# Patient Record
Sex: MALE | Race: BLACK OR AFRICAN AMERICAN
[De-identification: names, ages, dates, MRNs, and addresses within clinical notes are randomized per-mention and may not be internally consistent; named-entity substitution may affect disease eponyms.]

---

## 2018-04-25 ENCOUNTER — HOSPITAL ENCOUNTER (EMERGENCY)
Dept: HOSPITAL 54 - ER | Age: 38
Discharge: HOME | End: 2018-04-25
Payer: MEDICARE

## 2018-04-25 VITALS — WEIGHT: 190 LBS | BODY MASS INDEX: 27.2 KG/M2 | HEIGHT: 70 IN

## 2018-04-25 VITALS — SYSTOLIC BLOOD PRESSURE: 174 MMHG | DIASTOLIC BLOOD PRESSURE: 104 MMHG

## 2018-04-25 DIAGNOSIS — Z89.512: ICD-10-CM

## 2018-04-25 DIAGNOSIS — R19.7: Primary | ICD-10-CM

## 2018-04-25 DIAGNOSIS — I12.0: ICD-10-CM

## 2018-04-25 DIAGNOSIS — R10.9: ICD-10-CM

## 2018-04-25 DIAGNOSIS — N18.6: ICD-10-CM

## 2018-04-25 DIAGNOSIS — Z79.4: ICD-10-CM

## 2018-04-25 DIAGNOSIS — Z60.2: ICD-10-CM

## 2018-04-25 DIAGNOSIS — E11.42: ICD-10-CM

## 2018-04-25 DIAGNOSIS — E11.22: ICD-10-CM

## 2018-04-25 DIAGNOSIS — K21.9: ICD-10-CM

## 2018-04-25 LAB
ALBUMIN SERPL BCP-MCNC: 2.8 G/DL (ref 3.4–5)
ALP SERPL-CCNC: 144 U/L (ref 46–116)
ALT SERPL W P-5'-P-CCNC: 18 U/L (ref 12–78)
AST SERPL W P-5'-P-CCNC: 17 U/L (ref 15–37)
BASOPHILS # BLD AUTO: 0.1 /CMM (ref 0–0.2)
BASOPHILS NFR BLD AUTO: 0.4 % (ref 0–2)
BILIRUB DIRECT SERPL-MCNC: 0.1 MG/DL (ref 0–0.2)
BILIRUB SERPL-MCNC: 0.6 MG/DL (ref 0.2–1)
BUN SERPL-MCNC: 32 MG/DL (ref 7–18)
CALCIUM SERPL-MCNC: 8 MG/DL (ref 8.5–10.1)
CHLORIDE SERPL-SCNC: 98 MMOL/L (ref 98–107)
CO2 SERPL-SCNC: 29 MMOL/L (ref 21–32)
CREAT SERPL-MCNC: 13.1 MG/DL (ref 0.6–1.3)
EOSINOPHIL # BLD AUTO: 0.2 /CMM (ref 0–0.7)
EOSINOPHIL NFR BLD AUTO: 0.8 % (ref 0–6)
GLUCOSE SERPL-MCNC: 123 MG/DL (ref 74–106)
HCT VFR BLD AUTO: 31 % (ref 39–51)
HGB BLD-MCNC: 10.7 G/DL (ref 13.5–17.5)
LIPASE SERPL-CCNC: 38 U/L (ref 73–393)
LYMPHOCYTES NFR BLD AUTO: 1.1 /CMM (ref 0.8–4.8)
LYMPHOCYTES NFR BLD AUTO: 5.9 % (ref 20–44)
MCH RBC QN AUTO: 29 PG (ref 26–33)
MCHC RBC AUTO-ENTMCNC: 34 G/DL (ref 31–36)
MCV RBC AUTO: 83 FL (ref 80–96)
MONOCYTES NFR BLD AUTO: 0.9 /CMM (ref 0.1–1.3)
MONOCYTES NFR BLD AUTO: 4.7 % (ref 2–12)
NEUTROPHILS # BLD AUTO: 17.2 /CMM (ref 1.8–8.9)
NEUTROPHILS NFR BLD AUTO: 88.2 % (ref 43–81)
PLATELET # BLD AUTO: 470 /CMM (ref 150–450)
POTASSIUM SERPL-SCNC: 3.6 MMOL/L (ref 3.5–5.1)
PROT SERPL-MCNC: 8 G/DL (ref 6.4–8.2)
RBC # BLD AUTO: 3.74 MIL/UL (ref 4.5–6)
RDW COEFFICIENT OF VARIATION: 18.5 (ref 11.5–15)
SODIUM SERPL-SCNC: 137 MMOL/L (ref 136–145)
WBC NRBC COR # BLD AUTO: 19.5 K/UL (ref 4.3–11)

## 2018-04-25 PROCEDURE — A4606 OXYGEN PROBE USED W OXIMETER: HCPCS

## 2018-04-25 PROCEDURE — Z7610: HCPCS

## 2018-04-25 SDOH — SOCIAL STABILITY - SOCIAL INSECURITY: PROBLEMS RELATED TO LIVING ALONE: Z60.2

## 2018-04-25 NOTE — NUR
CALLED [facility] FOR TRANSPORT BACK TO [facility], [place], TRIP 
#[id number]

-------------------------------------------------------------------------------

Addendum: 04/25/18 at 1938 by TAZ

-------------------------------------------------------------------------------

***[place] 2049

## 2018-04-25 NOTE — NUR
Patient discharged to home in stable condition. Written and verbal after care 
instructions given. Patient verbalizes understanding of instruction.

-------------------------------------------------------------------------------

Addendum: 04/25/18 at 1953 by ANDRES

-------------------------------------------------------------------------------

WRONG DOCUMENTATION.

## 2018-04-25 NOTE — NUR
BBPA FROM [facility]: NAUSEA, VOMITING, C.DIFF. NOTED L BKA. SEEN BY MD FOR 
EVAL. VSS. SAFETY AND COMFORT MEASURES PROVIDED. WILL MONITOR.

## 2018-06-03 ENCOUNTER — HOSPITAL ENCOUNTER (INPATIENT)
Dept: HOSPITAL 54 - ER | Age: 38
LOS: 4 days | Discharge: NURSING, REHAB OR LONG-TERM CARE | DRG: 314 | End: 2018-06-07
Attending: NURSE PRACTITIONER | Admitting: NURSE PRACTITIONER
Payer: MEDICARE

## 2018-06-03 VITALS — HEIGHT: 72 IN | WEIGHT: 226.25 LBS | BODY MASS INDEX: 30.65 KG/M2

## 2018-06-03 DIAGNOSIS — I12.0: ICD-10-CM

## 2018-06-03 DIAGNOSIS — Z99.2: ICD-10-CM

## 2018-06-03 DIAGNOSIS — Z89.512: ICD-10-CM

## 2018-06-03 DIAGNOSIS — D64.9: ICD-10-CM

## 2018-06-03 DIAGNOSIS — E83.9: ICD-10-CM

## 2018-06-03 DIAGNOSIS — Z89.429: ICD-10-CM

## 2018-06-03 DIAGNOSIS — Z86.711: ICD-10-CM

## 2018-06-03 DIAGNOSIS — A04.72: ICD-10-CM

## 2018-06-03 DIAGNOSIS — E10.22: ICD-10-CM

## 2018-06-03 DIAGNOSIS — I25.2: ICD-10-CM

## 2018-06-03 DIAGNOSIS — N18.6: ICD-10-CM

## 2018-06-03 DIAGNOSIS — Z79.4: ICD-10-CM

## 2018-06-03 DIAGNOSIS — E87.6: ICD-10-CM

## 2018-06-03 DIAGNOSIS — I42.9: ICD-10-CM

## 2018-06-03 DIAGNOSIS — J90: ICD-10-CM

## 2018-06-03 DIAGNOSIS — I31.3: Primary | ICD-10-CM

## 2018-06-03 DIAGNOSIS — E10.51: ICD-10-CM

## 2018-06-03 DIAGNOSIS — E10.65: ICD-10-CM

## 2018-06-03 DIAGNOSIS — Z89.411: ICD-10-CM

## 2018-06-03 DIAGNOSIS — K21.9: ICD-10-CM

## 2018-06-03 LAB
APTT PPP: 31 SEC (ref 23–34)
BASOPHILS # BLD AUTO: 0 /CMM (ref 0–0.2)
BASOPHILS NFR BLD AUTO: 0.2 % (ref 0–2)
EOSINOPHIL NFR BLD AUTO: 0.9 % (ref 0–6)
HCT VFR BLD AUTO: 36 % (ref 39–51)
HGB BLD-MCNC: 11.4 G/DL (ref 13.5–17.5)
INR PPP: 0.99 (ref 0.87–1.13)
LYMPHOCYTES NFR BLD AUTO: 1.9 /CMM (ref 0.8–4.8)
LYMPHOCYTES NFR BLD AUTO: 12.7 % (ref 20–44)
MCHC RBC AUTO-ENTMCNC: 32 G/DL (ref 31–36)
MCV RBC AUTO: 88 FL (ref 80–96)
MONOCYTES NFR BLD AUTO: 0.7 /CMM (ref 0.1–1.3)
MONOCYTES NFR BLD AUTO: 4.4 % (ref 2–12)
NEUTROPHILS # BLD AUTO: 12.3 /CMM (ref 1.8–8.9)
NEUTROPHILS NFR BLD AUTO: 81.8 % (ref 43–81)
PLATELET # BLD AUTO: 482 /CMM (ref 150–450)
RBC # BLD AUTO: 4.03 MIL/UL (ref 4.5–6)
RDW COEFFICIENT OF VARIATION: 21.8 (ref 11.5–15)
TROPONIN I SERPL-MCNC: < 0.017 NG/ML (ref 0–0.06)
WBC NRBC COR # BLD AUTO: 15.1 K/UL (ref 4.3–11)

## 2018-06-03 PROCEDURE — A4606 OXYGEN PROBE USED W OXIMETER: HCPCS

## 2018-06-03 PROCEDURE — Z7610: HCPCS

## 2018-06-03 NOTE — NUR
BBRA 39 FROM [facility]; CHEST PRESSURE ALL DAY. PT AOX3 RR EVEN AND 
UNLABORED. NO SOB NOTED. NAD NOTED. NO NVD AT THIS TIME. PT NOT DIAPHORETIC. PT 
GOWNED AND PLACED ON MONITOR. DR GONZALEZ AT BEDSIDE. PT WITH HD SITE RIGHT CW. 
LEFT BKA X 8 YRS AGO AND RIGHT BIG TOE AMPUTATION MARCH 2018.

## 2018-06-04 VITALS — SYSTOLIC BLOOD PRESSURE: 160 MMHG | DIASTOLIC BLOOD PRESSURE: 83 MMHG

## 2018-06-04 VITALS — SYSTOLIC BLOOD PRESSURE: 147 MMHG | DIASTOLIC BLOOD PRESSURE: 79 MMHG

## 2018-06-04 VITALS — SYSTOLIC BLOOD PRESSURE: 157 MMHG | DIASTOLIC BLOOD PRESSURE: 83 MMHG

## 2018-06-04 VITALS — SYSTOLIC BLOOD PRESSURE: 188 MMHG | DIASTOLIC BLOOD PRESSURE: 97 MMHG

## 2018-06-04 VITALS — SYSTOLIC BLOOD PRESSURE: 130 MMHG | DIASTOLIC BLOOD PRESSURE: 78 MMHG

## 2018-06-04 VITALS — DIASTOLIC BLOOD PRESSURE: 71 MMHG | SYSTOLIC BLOOD PRESSURE: 142 MMHG

## 2018-06-04 VITALS — DIASTOLIC BLOOD PRESSURE: 84 MMHG | SYSTOLIC BLOOD PRESSURE: 130 MMHG

## 2018-06-04 VITALS — DIASTOLIC BLOOD PRESSURE: 72 MMHG | SYSTOLIC BLOOD PRESSURE: 139 MMHG

## 2018-06-04 VITALS — DIASTOLIC BLOOD PRESSURE: 70 MMHG | SYSTOLIC BLOOD PRESSURE: 137 MMHG

## 2018-06-04 VITALS — DIASTOLIC BLOOD PRESSURE: 71 MMHG | SYSTOLIC BLOOD PRESSURE: 140 MMHG

## 2018-06-04 VITALS — DIASTOLIC BLOOD PRESSURE: 75 MMHG | SYSTOLIC BLOOD PRESSURE: 145 MMHG

## 2018-06-04 VITALS — DIASTOLIC BLOOD PRESSURE: 85 MMHG | SYSTOLIC BLOOD PRESSURE: 154 MMHG

## 2018-06-04 VITALS — SYSTOLIC BLOOD PRESSURE: 140 MMHG | DIASTOLIC BLOOD PRESSURE: 68 MMHG

## 2018-06-04 VITALS — SYSTOLIC BLOOD PRESSURE: 144 MMHG | DIASTOLIC BLOOD PRESSURE: 71 MMHG

## 2018-06-04 VITALS — SYSTOLIC BLOOD PRESSURE: 140 MMHG | DIASTOLIC BLOOD PRESSURE: 71 MMHG

## 2018-06-04 VITALS — DIASTOLIC BLOOD PRESSURE: 97 MMHG | SYSTOLIC BLOOD PRESSURE: 188 MMHG

## 2018-06-04 LAB
BUN SERPL-MCNC: 22 MG/DL (ref 7–18)
CALCIUM SERPL-MCNC: 9.2 MG/DL (ref 8.5–10.1)
CHLORIDE SERPL-SCNC: 99 MMOL/L (ref 98–107)
CHOLEST SERPL-MCNC: 127 MG/DL (ref ?–200)
CO2 SERPL-SCNC: 33 MMOL/L (ref 21–32)
CREAT SERPL-MCNC: 8.6 MG/DL (ref 0.6–1.3)
FERRITIN SERPL-MCNC: 136 NG/ML (ref 8–388)
GLUCOSE SERPL-MCNC: 133 MG/DL (ref 74–106)
HDLC SERPL-MCNC: 38 MG/DL (ref 40–60)
IRON SERPL-MCNC: 30 UG/DL (ref 50–175)
LDLC SERPL DIRECT ASSAY-MCNC: 74 MG/DL (ref 0–99)
MAGNESIUM SERPL-MCNC: 2.2 MG/DL (ref 1.8–2.4)
NT-PROBNP SERPL-MCNC: (no result) PG/ML (ref 0–125)
PHOSPHATE SERPL-MCNC: 3.8 MG/DL (ref 2.5–4.9)
POTASSIUM SERPL-SCNC: 3.3 MMOL/L (ref 3.5–5.1)
SODIUM SERPL-SCNC: 140 MMOL/L (ref 136–145)
TIBC SERPL-MCNC: 138 UG/DL (ref 250–450)
TRIGL SERPL-MCNC: 68 MG/DL (ref 30–150)
TSH SERPL DL<=0.005 MIU/L-ACNC: 5.08 UIU/ML (ref 0.36–3.74)

## 2018-06-04 PROCEDURE — 5A1D70Z PERFORMANCE OF URINARY FILTRATION, INTERMITTENT, LESS THAN 6 HOURS PER DAY: ICD-10-PCS | Performed by: INTERNAL MEDICINE

## 2018-06-04 RX ADMIN — Medication SCH EACH: at 21:55

## 2018-06-04 RX ADMIN — PANTOPRAZOLE SODIUM SCH MG: 40 TABLET, DELAYED RELEASE ORAL at 09:14

## 2018-06-04 RX ADMIN — HEPARIN SODIUM SCH UNITS: 5000 INJECTION INTRAVENOUS; SUBCUTANEOUS at 21:54

## 2018-06-04 RX ADMIN — AMLODIPINE BESYLATE SCH MG: 10 TABLET ORAL at 09:13

## 2018-06-04 RX ADMIN — Medication SCH EACH: at 17:32

## 2018-06-04 RX ADMIN — FERROUS SULFATE TAB 325 MG (65 MG ELEMENTAL FE) SCH MG: 325 (65 FE) TAB at 09:14

## 2018-06-04 RX ADMIN — ZOLPIDEM TARTRATE PRN MG: 5 TABLET, FILM COATED ORAL at 02:18

## 2018-06-04 RX ADMIN — HEPARIN SODIUM SCH UNITS: 5000 INJECTION INTRAVENOUS; SUBCUTANEOUS at 09:15

## 2018-06-04 RX ADMIN — ASPIRIN SCH MG: 325 TABLET, FILM COATED ORAL at 09:12

## 2018-06-04 RX ADMIN — FERROUS SULFATE TAB 325 MG (65 MG ELEMENTAL FE) SCH MG: 325 (65 FE) TAB at 17:19

## 2018-06-04 RX ADMIN — ISOSORBIDE DINITRATE SCH MG: 20 TABLET ORAL at 09:13

## 2018-06-04 RX ADMIN — PREGABALIN SCH MG: 25 CAPSULE ORAL at 09:13

## 2018-06-04 RX ADMIN — VANCOMYCIN HYDROCHLORIDE SCH MG: 125 CAPSULE ORAL at 17:18

## 2018-06-04 RX ADMIN — INSULIN GLARGINE SCH UNIT: 100 INJECTION, SOLUTION SUBCUTANEOUS at 22:00

## 2018-06-04 RX ADMIN — CARVEDILOL SCH MG: 12.5 TABLET, FILM COATED ORAL at 17:20

## 2018-06-04 RX ADMIN — CARVEDILOL SCH MG: 12.5 TABLET, FILM COATED ORAL at 09:14

## 2018-06-04 RX ADMIN — Medication SCH EACH: at 12:22

## 2018-06-04 RX ADMIN — ISOSORBIDE DINITRATE SCH MG: 20 TABLET ORAL at 17:19

## 2018-06-04 RX ADMIN — ATORVASTATIN CALCIUM SCH MG: 10 TABLET, FILM COATED ORAL at 21:55

## 2018-06-04 RX ADMIN — VANCOMYCIN HYDROCHLORIDE SCH MG: 125 CAPSULE ORAL at 12:21

## 2018-06-04 RX ADMIN — Medication SCH EACH: at 06:39

## 2018-06-04 NOTE — NUR
MS RN OPENING NOTES: 



PATIENT IN BED, AOX3, ON O2 AT 2 LPM VIA NC, BREATHING EVEN AND UNLABORED. BREATH SOUNDS 
CLEAR TO AUSCULTATION.  PATIENT APPEARS CALM AND IN NO DISTRESS. DENIES PAIN/ CHEST PAIN AT 
THIS TIME.  PATIENT HAS HD CATHETER OVER  LFA G 18 AND RAC G 18, INTACT AND PATENT TO FLUSH. 
 PROVIDED FOR COMFORT AND SAFETY. BED IN LOWEST AND LOCKED POSITION, SIDERAILS UP X 3, CALL 
LIGHT WITHIN REACH. WILL CONT TO MONITOR.

## 2018-06-04 NOTE — NUR
PT BACK FROM CT ANGIO HEART W/ 3 D IMAGE PROCEDURE WITH STABLE V/S.DENIES ANY PAIN OR 
DISTRESS.METOPROLOL 10 MG (5 MG IVPX2) AND NTG GIVEN IN CT.

## 2018-06-04 NOTE — NUR
TELE RN AM NOTES



RECEIVED PT A & O X 3, ABLE TO MAKE NEEDS KNOWN. PATIENT ON 2L O2 VIA NC, TOLERATING WELL. 
NO SOB, NO S/S OF PAIN OR ACUTE DISTRESS NOTED.ON TELE MONITORING, SR 76.HOB ELEVATED. HAS 
HD CATHETER IN PLACE TO RCW. IV ACCESS TO  LFA 18G, INTACT PATENT, SL. PATIENT PLACED ON 
CONTACT ISOLATION PREC. FOR C DIFF.COMFORTABLE IN BED. SAFETY MEASURES APPLIED, BED IN LOW 
LOCKED POSITION. SIDE RAILS UP X2, CALL LIGHT WITHIN REACH. WILL CONTINUE TO MONITOR CLOSELY 
FOR CHANGES.

## 2018-06-04 NOTE — NUR
RN NOTES: 



PATIENT'S BLOOD SUGAR CHECKED  MG/DL. PATIENT REFUSED INSULIN SAYING "MY PANCREAS WORK 
AND IT WILL GO DOWN LATER." RISKS AND BENEFITS EXPLAINED, PATIENT STILL REFUSING. ALSO, 
PATIENT REFUSED HEPARIN, SAYING "I DO NOT EVER WANT THAT, I EVEN DO NOT TAKE IT DURING 
DIALYSIS. " AGAIN, RISKS AND INDICATIONS EXPLAINED, PATIENT STILL REFUSED.

## 2018-06-04 NOTE — NUR
TELE RN NEW ADMISSION NOTES



RECEIVED PT FROM ER ACCOMPANIED BY STAFF [facility] [employer]. A & O X 3, ABLE TO MAKE NEEDS KNOWN. 
PATIENT ON 2L O2 VIA NC, TOLERATING WELL. NO SOB, NO S/S OF PAIN OR ACUTE DISTRESS NOTED OR 
REPORTED AT THIS TIME. ON TELE MONITORING, SR 92. BP ELEVATED /97, MD AWARE. BODY 
CHECK DONE, PHOTOS TAKEN, PLACED IN THE CHART.  HOB ELEVATED. HAS HD CATHETER IN PLACE TO 
RCW. IV ACCESS TO  LFA 18G, INTACT PATENT, SL. PATIENT PLACED ON CONTACT ISOLATION PREC. ALL 
BELONGINGS CHECKED ACCOUNTED FOR & DOCUMENTED BY CNA. ALL ORDERS REVIEWED BY MD. NOTED & 
CARRIED OUT. MADE COMFORTABLE IN BED. SAFETY MEASURES APPLIED, BED IN LOW LOCKED POSITION. 
SIDE RAILS UP X2, CALL LIGHT WITHIN REACH. WILL CONTINUE TO MONITOR CLOSELY FOR CHANGES.

## 2018-06-04 NOTE — NUR
HEMODIALYSIS PROCEDURE COMPLETED WITH STABLE V/S /79 HR 64.WITH 2.5 LITERS OUTPUT.PT 
TOLERATED WELL.

## 2018-06-04 NOTE — NUR
ICU/RN:



Pt brought to radiology in stable condition, A&Ox4, breathing even and unlabored on 2L/min 
via NC. IV HLx2 patent flushed. NSR 79 on monitor. Educated. Placed on monitor. 



1025 - Metoprolol 5mg IVP administered.

-------------------------------------------------------------------------------

Addendum: 06/04/18 at 1119 by IRWIN BARNES RN

-------------------------------------------------------------------------------

1105 



Bedside report given to primary RN, NARAYAN, SR 70-80 on monitor, SBP in 130-140's. Pt awake, 
alert denies pain discomfort. Call light within reach. 



Metoprolol 5mg x2 doses and Nitro SL tab given during CTA. Orders faxed to pharmacy.

## 2018-06-04 NOTE — NUR
Patient is alert and oriented, resides at [facility] 

[phone]. He ambulates with hand held assist, semi-independent with 

adl's. He received hemodialysis at [facility] [phone] every MWF. Patient is 
currently on7 days bedhold. Will dc back to SNF when discharge. 

-------------------------------------------------------------------------------

Addendum: 06/04/18 at 1727 by RIKKI PLATA RN

-------------------------------------------------------------------------------

Amended: Links added.

## 2018-06-04 NOTE — NUR
TELE RN CLOSING NOTE



PATIENT IN BED, SLEEPING, EASILY AROUSED WITH VERBAL STIMULI, ORIENTED X3. ON 2 L O2 VIA NC, 
TOLERATING WELL. IN NO APPARENT DISTRESS OR DISCOMFORT AT THIS TIME. DENIES CHEST PAIN AND 
SOB. RESPIRATIONS EVEN AND UNLABORED. ABLE TO VERBALIZE NEEDS. CONTACT ISOLATION OBSERVED. L 
FA 18G IVC SL, PATENT AND INTACT. PATIENT ON TELE MONITORING SINUS RHYTHM 70 HR. PATIENT'S 
PROSTATIC LEG AT BEDSIDE. ON BED REST USES URINAL. KEPT CLEAN AND COMFORTABLE, INDEPENDENT 
WITH BED MOBILITY. ALL NEEDS ATTENDED. ALL ORDERS CARRIED OUT FOR THE SHIFT. SAFETY MEASURES 
IN PLACE, BED IN LOW LOCKED POSITION SIDE RAILS UP X2, CALL LIGHT WITHIN EASY REACH. WILL 
ENDORSE TO AM NURSE FOR DOMI.

## 2018-06-04 NOTE — NUR
PATIENT'S BP DECREASED /83 WITH HR OF 75. PATIENT IS SLEEPING COMFORTABLE, IN NO 
APPARENT DOSTRESS AT THIS TIME. WILL CONTINUE TO MONITOR.

## 2018-06-05 VITALS — DIASTOLIC BLOOD PRESSURE: 76 MMHG | SYSTOLIC BLOOD PRESSURE: 130 MMHG

## 2018-06-05 VITALS — DIASTOLIC BLOOD PRESSURE: 81 MMHG | SYSTOLIC BLOOD PRESSURE: 153 MMHG

## 2018-06-05 VITALS — DIASTOLIC BLOOD PRESSURE: 71 MMHG | SYSTOLIC BLOOD PRESSURE: 140 MMHG

## 2018-06-05 LAB
BASOPHILS # BLD AUTO: 0 /CMM (ref 0–0.2)
BASOPHILS NFR BLD AUTO: 0.4 % (ref 0–2)
BUN SERPL-MCNC: 21 MG/DL (ref 7–18)
CALCIUM SERPL-MCNC: 8.4 MG/DL (ref 8.5–10.1)
CHLORIDE SERPL-SCNC: 100 MMOL/L (ref 98–107)
CHOLEST SERPL-MCNC: 125 MG/DL (ref ?–200)
CO2 SERPL-SCNC: 34 MMOL/L (ref 21–32)
CREAT SERPL-MCNC: 8 MG/DL (ref 0.6–1.3)
EOSINOPHIL NFR BLD AUTO: 1.4 % (ref 0–6)
GLUCOSE SERPL-MCNC: 130 MG/DL (ref 74–106)
HCT VFR BLD AUTO: 29 % (ref 39–51)
HDLC SERPL-MCNC: 41 MG/DL (ref 40–60)
HGB BLD-MCNC: 9.5 G/DL (ref 13.5–17.5)
LDLC SERPL DIRECT ASSAY-MCNC: 79 MG/DL (ref 0–99)
LYMPHOCYTES NFR BLD AUTO: 1.7 /CMM (ref 0.8–4.8)
LYMPHOCYTES NFR BLD AUTO: 17.4 % (ref 20–44)
MAGNESIUM SERPL-MCNC: 2.2 MG/DL (ref 1.8–2.4)
MCHC RBC AUTO-ENTMCNC: 33 G/DL (ref 31–36)
MCV RBC AUTO: 88 FL (ref 80–96)
MONOCYTES NFR BLD AUTO: 0.5 /CMM (ref 0.1–1.3)
MONOCYTES NFR BLD AUTO: 5.1 % (ref 2–12)
NEUTROPHILS # BLD AUTO: 7.3 /CMM (ref 1.8–8.9)
NEUTROPHILS NFR BLD AUTO: 75.7 % (ref 43–81)
PHOSPHATE SERPL-MCNC: 4.1 MG/DL (ref 2.5–4.9)
PLATELET # BLD AUTO: 363 /CMM (ref 150–450)
POTASSIUM SERPL-SCNC: 4.3 MMOL/L (ref 3.5–5.1)
RBC # BLD AUTO: 3.22 MIL/UL (ref 4.5–6)
RDW COEFFICIENT OF VARIATION: 22.1 (ref 11.5–15)
SODIUM SERPL-SCNC: 139 MMOL/L (ref 136–145)
TRIGL SERPL-MCNC: 39 MG/DL (ref 30–150)
WBC NRBC COR # BLD AUTO: 9.6 K/UL (ref 4.3–11)

## 2018-06-05 PROCEDURE — 0W9B3ZZ DRAINAGE OF LEFT PLEURAL CAVITY, PERCUTANEOUS APPROACH: ICD-10-PCS

## 2018-06-05 RX ADMIN — INSULIN HUMAN PRN UNIT: 100 INJECTION, SOLUTION PARENTERAL at 18:37

## 2018-06-05 RX ADMIN — AMLODIPINE BESYLATE SCH MG: 10 TABLET ORAL at 08:36

## 2018-06-05 RX ADMIN — FERROUS SULFATE TAB 325 MG (65 MG ELEMENTAL FE) SCH MG: 325 (65 FE) TAB at 17:44

## 2018-06-05 RX ADMIN — VANCOMYCIN HYDROCHLORIDE SCH MG: 125 CAPSULE ORAL at 12:27

## 2018-06-05 RX ADMIN — Medication SCH EACH: at 06:15

## 2018-06-05 RX ADMIN — FERROUS SULFATE TAB 325 MG (65 MG ELEMENTAL FE) SCH MG: 325 (65 FE) TAB at 08:36

## 2018-06-05 RX ADMIN — VANCOMYCIN HYDROCHLORIDE SCH MG: 125 CAPSULE ORAL at 00:29

## 2018-06-05 RX ADMIN — VANCOMYCIN HYDROCHLORIDE SCH MG: 125 CAPSULE ORAL at 06:14

## 2018-06-05 RX ADMIN — Medication SCH EACH: at 22:00

## 2018-06-05 RX ADMIN — CARVEDILOL SCH MG: 12.5 TABLET, FILM COATED ORAL at 08:35

## 2018-06-05 RX ADMIN — VANCOMYCIN HYDROCHLORIDE SCH MG: 125 CAPSULE ORAL at 18:20

## 2018-06-05 RX ADMIN — Medication SCH EACH: at 18:20

## 2018-06-05 RX ADMIN — CARVEDILOL SCH MG: 12.5 TABLET, FILM COATED ORAL at 17:46

## 2018-06-05 RX ADMIN — HEPARIN SODIUM SCH UNITS: 5000 INJECTION INTRAVENOUS; SUBCUTANEOUS at 21:00

## 2018-06-05 RX ADMIN — ISOSORBIDE DINITRATE SCH MG: 20 TABLET ORAL at 08:35

## 2018-06-05 RX ADMIN — INSULIN GLARGINE SCH UNIT: 100 INJECTION, SOLUTION SUBCUTANEOUS at 22:00

## 2018-06-05 RX ADMIN — Medication SCH EACH: at 12:35

## 2018-06-05 RX ADMIN — PANTOPRAZOLE SODIUM SCH MG: 40 TABLET, DELAYED RELEASE ORAL at 08:35

## 2018-06-05 RX ADMIN — PREGABALIN SCH MG: 25 CAPSULE ORAL at 08:35

## 2018-06-05 RX ADMIN — ASPIRIN SCH MG: 325 TABLET, FILM COATED ORAL at 08:34

## 2018-06-05 RX ADMIN — ATORVASTATIN CALCIUM SCH MG: 10 TABLET, FILM COATED ORAL at 22:58

## 2018-06-05 RX ADMIN — ISOSORBIDE DINITRATE SCH MG: 20 TABLET ORAL at 17:43

## 2018-06-05 NOTE — NUR
MS/RN NOTES



GLUCOMETER MACHINES NOT WORKING. NETWORK ERROR. LAB NOTIFIED AND CONTACTED IT DEPT TO 
RESOLVE THE ISSUE BUT ABLE TO FIX. LAB TO DRAW RANDOM GLUCOSE LEVELS

## 2018-06-05 NOTE — NUR
US GUIDED THORACENTESIS OF THE LEFT LUNG PROCEDURE WAS DONE BY DR PAUL AT BEDSIDE.CONSENT 
WAS SIGNED.TOLERATED WELL.DRAINED 310 ML OF YELLOW PLEURAL FLUID.NO BLEEDING NOTED.

## 2018-06-05 NOTE — NUR
MS PATRICIA AM NOTES



RECEIVED PT A & O X 3, ABLE TO MAKE NEEDS KNOWN. PATIENT ON 2L O2 VIA NC, TOLERATING WELL. 
NO SOB, NO C/O  PAIN OR ACUTE DISTRESS NOTED.HOB ELEVATED. WITH HD CATHETER ON RCW. IV 
ACCESS TO  LFA 18G, INTACT PATENT, SL. PATIENT PLACED ON CONTACT ISOLATION PREC. FOR C 
DIFF.COMFORTABLE IN BED. SAFETY MEASURES APPLIED, BED IN LOW LOCKED POSITION. SIDE RAILS UP 
X2, CALL LIGHT WITHIN REACH. WILL CONTINUE TO MONITOR CLOSELY FOR CHANGES.

## 2018-06-05 NOTE — NUR
MS RN CLOSING NOTES: 

PATIENT IN BED, AOX4, ON O2 AT 2 LPM VIA NC, BREATHING EVEN AND UNLABORED. APPEARS CALM AND 
IN NO DISTRESS. DID NOT HAVE ANY CHEST PAIN THROUGH NIGHT. AM BLOOD SUGAR CHECKED  
MG/DL.  DUE MEDS GIVEN. PROVIDED FOR COMFORT AND SAFETY. BED IN LOWEST AND LOCKED POSITION, 
SIDERAILS UP X 3, CALL [facility] WITHIN REACH. WILL ENDORSE TO AM RN FOR DOMI.

## 2018-06-05 NOTE — NUR
MS/RN OPENING NOTES



PT RECEIVED WITH EYES CLOSED. A/OX4. CURRENTLY ON 2L O2 VIA NC, BREATHING EVEN AND 
UNLABORED. DENIES SOB OR PAIN AT THIS TIME. IV TO LFA AND RAC PATENT AND INTACT. RCW HD CATH 
NOTED. DRESSING C/D/I. BED IN LOW/LOCKED POSITION WITH CALL LIGHT IN REACH. SIDE RAILS UPX2. 
WILL CONTINUE TO MONITOR

## 2018-06-06 VITALS — SYSTOLIC BLOOD PRESSURE: 173 MMHG | DIASTOLIC BLOOD PRESSURE: 89 MMHG

## 2018-06-06 VITALS — DIASTOLIC BLOOD PRESSURE: 85 MMHG | SYSTOLIC BLOOD PRESSURE: 159 MMHG

## 2018-06-06 VITALS — SYSTOLIC BLOOD PRESSURE: 156 MMHG | DIASTOLIC BLOOD PRESSURE: 84 MMHG

## 2018-06-06 VITALS — SYSTOLIC BLOOD PRESSURE: 152 MMHG | DIASTOLIC BLOOD PRESSURE: 85 MMHG

## 2018-06-06 LAB
BASOPHILS # BLD AUTO: 0 /CMM (ref 0–0.2)
BASOPHILS NFR BLD AUTO: 0.4 % (ref 0–2)
BUN SERPL-MCNC: 31 MG/DL (ref 7–18)
CALCIUM SERPL-MCNC: 8 MG/DL (ref 8.5–10.1)
CHLORIDE SERPL-SCNC: 98 MMOL/L (ref 98–107)
CO2 SERPL-SCNC: 30 MMOL/L (ref 21–32)
CREAT SERPL-MCNC: 9.4 MG/DL (ref 0.6–1.3)
EOSINOPHIL NFR BLD AUTO: 1.2 % (ref 0–6)
GLUCOSE SERPL-MCNC: 129 MG/DL (ref 74–106)
HCT VFR BLD AUTO: 29 % (ref 39–51)
HGB BLD-MCNC: 9.6 G/DL (ref 13.5–17.5)
LYMPHOCYTES NFR BLD AUTO: 1.8 /CMM (ref 0.8–4.8)
LYMPHOCYTES NFR BLD AUTO: 17.6 % (ref 20–44)
MAGNESIUM SERPL-MCNC: 2.2 MG/DL (ref 1.8–2.4)
MCHC RBC AUTO-ENTMCNC: 34 G/DL (ref 31–36)
MCV RBC AUTO: 88 FL (ref 80–96)
MONOCYTES NFR BLD AUTO: 0.5 /CMM (ref 0.1–1.3)
MONOCYTES NFR BLD AUTO: 5.3 % (ref 2–12)
NEUTROPHILS # BLD AUTO: 7.5 /CMM (ref 1.8–8.9)
NEUTROPHILS NFR BLD AUTO: 75.5 % (ref 43–81)
PHOSPHATE SERPL-MCNC: 4.9 MG/DL (ref 2.5–4.9)
PLATELET # BLD AUTO: 339 /CMM (ref 150–450)
POTASSIUM SERPL-SCNC: 4.4 MMOL/L (ref 3.5–5.1)
RBC # BLD AUTO: 3.23 MIL/UL (ref 4.5–6)
RDW COEFFICIENT OF VARIATION: 21 (ref 11.5–15)
SODIUM SERPL-SCNC: 137 MMOL/L (ref 136–145)
WBC NRBC COR # BLD AUTO: 10 K/UL (ref 4.3–11)

## 2018-06-06 PROCEDURE — 5A1D70Z PERFORMANCE OF URINARY FILTRATION, INTERMITTENT, LESS THAN 6 HOURS PER DAY: ICD-10-PCS | Performed by: INTERNAL MEDICINE

## 2018-06-06 RX ADMIN — ATORVASTATIN CALCIUM SCH MG: 10 TABLET, FILM COATED ORAL at 21:16

## 2018-06-06 RX ADMIN — AMLODIPINE BESYLATE SCH MG: 10 TABLET ORAL at 08:46

## 2018-06-06 RX ADMIN — VANCOMYCIN HYDROCHLORIDE SCH MG: 125 CAPSULE ORAL at 01:05

## 2018-06-06 RX ADMIN — HEPARIN SODIUM SCH UNITS: 5000 INJECTION INTRAVENOUS; SUBCUTANEOUS at 21:15

## 2018-06-06 RX ADMIN — FERROUS SULFATE TAB 325 MG (65 MG ELEMENTAL FE) SCH MG: 325 (65 FE) TAB at 08:33

## 2018-06-06 RX ADMIN — VANCOMYCIN HYDROCHLORIDE SCH MG: 125 CAPSULE ORAL at 17:18

## 2018-06-06 RX ADMIN — INSULIN HUMAN PRN UNIT: 100 INJECTION, SOLUTION PARENTERAL at 11:43

## 2018-06-06 RX ADMIN — PREGABALIN SCH MG: 25 CAPSULE ORAL at 08:33

## 2018-06-06 RX ADMIN — VANCOMYCIN HYDROCHLORIDE SCH MG: 125 CAPSULE ORAL at 11:32

## 2018-06-06 RX ADMIN — FERROUS SULFATE TAB 325 MG (65 MG ELEMENTAL FE) SCH MG: 325 (65 FE) TAB at 16:06

## 2018-06-06 RX ADMIN — INSULIN GLARGINE SCH UNIT: 100 INJECTION, SOLUTION SUBCUTANEOUS at 22:00

## 2018-06-06 RX ADMIN — INSULIN HUMAN PRN UNIT: 100 INJECTION, SOLUTION PARENTERAL at 21:37

## 2018-06-06 RX ADMIN — Medication SCH EACH: at 07:32

## 2018-06-06 RX ADMIN — ASPIRIN SCH MG: 325 TABLET, FILM COATED ORAL at 08:33

## 2018-06-06 RX ADMIN — ISOSORBIDE DINITRATE SCH MG: 20 TABLET ORAL at 08:38

## 2018-06-06 RX ADMIN — HEPARIN SODIUM SCH UNITS: 5000 INJECTION INTRAVENOUS; SUBCUTANEOUS at 08:45

## 2018-06-06 RX ADMIN — PANTOPRAZOLE SODIUM SCH MG: 40 TABLET, DELAYED RELEASE ORAL at 08:33

## 2018-06-06 RX ADMIN — ISOSORBIDE DINITRATE SCH MG: 20 TABLET ORAL at 16:05

## 2018-06-06 RX ADMIN — Medication SCH EACH: at 11:31

## 2018-06-06 RX ADMIN — HEPARIN SODIUM SCH UNITS: 5000 INJECTION INTRAVENOUS; SUBCUTANEOUS at 21:00

## 2018-06-06 RX ADMIN — CARVEDILOL SCH MG: 12.5 TABLET, FILM COATED ORAL at 16:04

## 2018-06-06 RX ADMIN — Medication SCH EACH: at 21:16

## 2018-06-06 RX ADMIN — VANCOMYCIN HYDROCHLORIDE SCH MG: 125 CAPSULE ORAL at 05:40

## 2018-06-06 RX ADMIN — CARVEDILOL SCH MG: 12.5 TABLET, FILM COATED ORAL at 08:44

## 2018-06-06 RX ADMIN — Medication SCH EACH: at 17:17

## 2018-06-06 NOTE — NUR
MS/RN NOTES



RANDOM GLUCOSE XUYTR=280. PT REFUSING LANTUS AND REGULAR INSULIN. "I DONT NEED IT, MY SUGAR 
GOES DOWN ON ITS OWN". EDUCATION PROVIDED OF RISKS/BENEFITS, PT STRONGLY REFUSING.

## 2018-06-06 NOTE — NUR
MS RN CLOSING NOTES: 

PATIENT IN BED, AOX4, ON O2 AT 2 L VIA NC, BREATHING EVEN AND UNLABORED. APPEARS CALM AND IN 
NO DISTRESS. DID NOT HAVE ANY PAIN DURING THE SHIFT. PROVIDED FOR COMFORT AND SAFETY. BED IN 
LOWEST AND LOCKED POSITION, SIDERAILS UP X 3, CALL LIG WITHIN REACH. WILL ENDORSE TO NIGHT 
NURSE TO CONTINUE CARE

## 2018-06-06 NOTE — NUR
MS PATRICIA AM NOTES



RECEIVED PT A & O X 3, ABLE TO MAKE NEEDS KNOWN. PATIENT ON 2L O2 VIA NC, TOLERATING WELL. 
NO SOB, NO C/O  PAIN OR ACUTE DISTRESS NOTED.HOB ELEVATED. WITH HD CATHETER ON RCW. IV 
ACCESS TO  LFA 18G, INTACT PATENT, SL. PATIENT P ON CONTACT ISOLATION PREC. FOR  HX C 
DIFF.COMFORTABLE IN BED. SAFETY MEASURES APPLIED, BED IN LOW LOCKED POSITION. SIDE RAILS UP 
X2, CALL LIGHT WITHIN REACH. WILL CONTINUE TO MONITOR CLOSELY FOR CHANGES.

## 2018-06-06 NOTE — NUR
RN NOTES

RECEIVED PT. SLEEPING BUT AROUSABLE, DENIES PAIN, NO SOB,.DENIES PAIN, NO SOB, CALL LIGHT 
WITHIN REACH, SIDERAILSUPX2, CONTINUE TO MONITOR

## 2018-06-06 NOTE — NUR
MS/RN CLOSING NOTES



PT WITH EYES CLOSED. AROUSABLE TO NAME/TOUCH. ON O2 2L VIA NC, BREATHING EVEN AND UNLABORED. 
NO C/O OF CHEST PAIN, SOB OR N/V DURING SHIFT. IV TO LFA, RAC PATENT AND INTACT. PT REFUSING 
INSULIN AND HEPARIN DURING SHIFT. EDUCATION PROVIDED X3, HOWEVER PT STILL REFUSING. PT FOR 
HD TODAY. NO SIGNIFCANT CHANGES OVERNIGHT. KEPT PT COMFORTABLE DURING SHIFT. ALL NEEDS MET. 
BED IN LOW/LOCKED POSITION WITH CALL LIGHT IN REACH .SIDE RAILS UPX2. ENDORSED TO DAY SHIFT 
RN DOMI.

## 2018-06-07 VITALS — DIASTOLIC BLOOD PRESSURE: 90 MMHG | SYSTOLIC BLOOD PRESSURE: 155 MMHG

## 2018-06-07 VITALS — DIASTOLIC BLOOD PRESSURE: 90 MMHG | SYSTOLIC BLOOD PRESSURE: 169 MMHG

## 2018-06-07 VITALS — SYSTOLIC BLOOD PRESSURE: 148 MMHG | DIASTOLIC BLOOD PRESSURE: 75 MMHG

## 2018-06-07 LAB
BASOPHILS # BLD AUTO: 0 /CMM (ref 0–0.2)
BASOPHILS NFR BLD AUTO: 0.3 % (ref 0–2)
BUN SERPL-MCNC: 32 MG/DL (ref 7–18)
CALCIUM SERPL-MCNC: 8.1 MG/DL (ref 8.5–10.1)
CHLORIDE SERPL-SCNC: 96 MMOL/L (ref 98–107)
CO2 SERPL-SCNC: 31 MMOL/L (ref 21–32)
CREAT SERPL-MCNC: 8.4 MG/DL (ref 0.6–1.3)
EOSINOPHIL NFR BLD AUTO: 1.6 % (ref 0–6)
GLUCOSE SERPL-MCNC: 144 MG/DL (ref 74–106)
HCT VFR BLD AUTO: 30 % (ref 39–51)
HGB BLD-MCNC: 10 G/DL (ref 13.5–17.5)
LYMPHOCYTES NFR BLD AUTO: 1.4 /CMM (ref 0.8–4.8)
LYMPHOCYTES NFR BLD AUTO: 14.9 % (ref 20–44)
MCHC RBC AUTO-ENTMCNC: 33 G/DL (ref 31–36)
MCV RBC AUTO: 88 FL (ref 80–96)
MONOCYTES NFR BLD AUTO: 0.4 /CMM (ref 0.1–1.3)
MONOCYTES NFR BLD AUTO: 4.8 % (ref 2–12)
NEUTROPHILS # BLD AUTO: 7.4 /CMM (ref 1.8–8.9)
NEUTROPHILS NFR BLD AUTO: 78.4 % (ref 43–81)
PLATELET # BLD AUTO: 312 /CMM (ref 150–450)
POTASSIUM SERPL-SCNC: 4.5 MMOL/L (ref 3.5–5.1)
RBC # BLD AUTO: 3.42 MIL/UL (ref 4.5–6)
RDW COEFFICIENT OF VARIATION: 21 (ref 11.5–15)
SODIUM SERPL-SCNC: 133 MMOL/L (ref 136–145)
WBC NRBC COR # BLD AUTO: 9.4 K/UL (ref 4.3–11)

## 2018-06-07 PROCEDURE — 5A1D70Z PERFORMANCE OF URINARY FILTRATION, INTERMITTENT, LESS THAN 6 HOURS PER DAY: ICD-10-PCS | Performed by: INTERNAL MEDICINE

## 2018-06-07 RX ADMIN — CARVEDILOL SCH MG: 12.5 TABLET, FILM COATED ORAL at 08:55

## 2018-06-07 RX ADMIN — FERROUS SULFATE TAB 325 MG (65 MG ELEMENTAL FE) SCH MG: 325 (65 FE) TAB at 16:13

## 2018-06-07 RX ADMIN — ZOLPIDEM TARTRATE PRN MG: 5 TABLET, FILM COATED ORAL at 01:58

## 2018-06-07 RX ADMIN — ISOSORBIDE DINITRATE SCH MG: 20 TABLET ORAL at 16:13

## 2018-06-07 RX ADMIN — Medication SCH EACH: at 12:01

## 2018-06-07 RX ADMIN — HEPARIN SODIUM SCH UNITS: 5000 INJECTION INTRAVENOUS; SUBCUTANEOUS at 08:57

## 2018-06-07 RX ADMIN — ASPIRIN SCH MG: 325 TABLET, FILM COATED ORAL at 08:30

## 2018-06-07 RX ADMIN — PANTOPRAZOLE SODIUM SCH MG: 40 TABLET, DELAYED RELEASE ORAL at 08:31

## 2018-06-07 RX ADMIN — CARVEDILOL SCH MG: 12.5 TABLET, FILM COATED ORAL at 16:12

## 2018-06-07 RX ADMIN — VANCOMYCIN HYDROCHLORIDE SCH MG: 125 CAPSULE ORAL at 00:14

## 2018-06-07 RX ADMIN — INSULIN HUMAN PRN UNIT: 100 INJECTION, SOLUTION PARENTERAL at 17:18

## 2018-06-07 RX ADMIN — Medication SCH EACH: at 17:18

## 2018-06-07 RX ADMIN — INSULIN HUMAN PRN UNIT: 100 INJECTION, SOLUTION PARENTERAL at 12:06

## 2018-06-07 RX ADMIN — VANCOMYCIN HYDROCHLORIDE SCH MG: 125 CAPSULE ORAL at 05:41

## 2018-06-07 RX ADMIN — VANCOMYCIN HYDROCHLORIDE SCH MG: 125 CAPSULE ORAL at 12:08

## 2018-06-07 RX ADMIN — FERROUS SULFATE TAB 325 MG (65 MG ELEMENTAL FE) SCH MG: 325 (65 FE) TAB at 08:30

## 2018-06-07 RX ADMIN — VANCOMYCIN HYDROCHLORIDE SCH MG: 125 CAPSULE ORAL at 17:05

## 2018-06-07 RX ADMIN — ISOSORBIDE DINITRATE SCH MG: 20 TABLET ORAL at 08:55

## 2018-06-07 RX ADMIN — Medication SCH EACH: at 07:32

## 2018-06-07 RX ADMIN — PREGABALIN SCH MG: 25 CAPSULE ORAL at 08:30

## 2018-06-07 RX ADMIN — AMLODIPINE BESYLATE SCH MG: 10 TABLET ORAL at 08:55

## 2018-06-07 NOTE — NUR
MS RN DISCHARGE NOTES

Patient picked up by [facility] at 1745 - transferred to [facility]. Report given to Elin GOMEZ. No changes in LOC. No SOB noted. Patient A&O x3. No complains of any discomfort. 
Afebrile. Heplock on left forearm and Right AC removed, minimal bleeding noted. Dressing 
placed. IV catheter tips intact. All belongings taken with patient and confirms all 
belongings are present. Patient refused to take photos taken prior to discharge. Discharge 
papers signed by patient.

## 2018-06-07 NOTE — NUR
RN NOTES

Given report to PATRICIA Worthington at [facility]. Facility made aware of expected  around 
1730. RN confirmed.

## 2018-06-07 NOTE — NUR
PATRICIA NOTES

AWAKE, DENIES PAIN, NO SOB, DRESSING ON THE LEFT KNEE DRY AND INTACT, CALL LIGHT WITHIN 
REACH, SIDERAILSUPX2, PT. NEEDS ATTENDED

-------------------------------------------------------------------------------

Addendum: 06/07/18 at 0639 by MIN VASQUEZ RN

-------------------------------------------------------------------------------

WRONG PATIENT

## 2018-06-07 NOTE — NUR
RN NOTES

AWAKE, DENIES PAIN, NO SOB, MORNING CARE RENDERED, CALL LIGHT WITHIN REACH, SIDERAILSUPX2, 
PT. NEEDS ATTENDED

## 2018-06-07 NOTE — NUR
MS RN NOTES

Received patient in bed. Easily arousable. A&O x3. No complaints of any discomfort at this 
time. Safety measures in place. Call light within reach. Bed in lowest position. Will 
continue to monitor and assess patient.

## 2018-07-29 ENCOUNTER — HOSPITAL ENCOUNTER (INPATIENT)
Dept: HOSPITAL 91 - MS1 | Age: 38
LOS: 8 days | Discharge: HOME HEALTH | DRG: 314 | End: 2018-08-06
Payer: MEDICARE

## 2018-07-29 ENCOUNTER — HOSPITAL ENCOUNTER (INPATIENT)
Age: 38
LOS: 8 days | Discharge: HOME HEALTH | DRG: 314 | End: 2018-08-06

## 2018-07-29 DIAGNOSIS — E87.70: ICD-10-CM

## 2018-07-29 DIAGNOSIS — I31.3: Primary | ICD-10-CM

## 2018-07-29 DIAGNOSIS — K29.80: ICD-10-CM

## 2018-07-29 DIAGNOSIS — E78.5: ICD-10-CM

## 2018-07-29 DIAGNOSIS — E87.5: ICD-10-CM

## 2018-07-29 DIAGNOSIS — Z99.2: ICD-10-CM

## 2018-07-29 DIAGNOSIS — E11.22: ICD-10-CM

## 2018-07-29 DIAGNOSIS — I32: ICD-10-CM

## 2018-07-29 DIAGNOSIS — K21.0: ICD-10-CM

## 2018-07-29 DIAGNOSIS — I73.9: ICD-10-CM

## 2018-07-29 DIAGNOSIS — K29.70: ICD-10-CM

## 2018-07-29 DIAGNOSIS — D63.1: ICD-10-CM

## 2018-07-29 DIAGNOSIS — I12.0: ICD-10-CM

## 2018-07-29 DIAGNOSIS — N18.6: ICD-10-CM

## 2018-07-29 DIAGNOSIS — E11.40: ICD-10-CM

## 2018-07-29 DIAGNOSIS — Z89.512: ICD-10-CM

## 2018-07-29 DIAGNOSIS — I42.9: ICD-10-CM

## 2018-07-29 LAB
ADD MAN DIFF?: NO
ALANINE AMINOTRANSFERASE: 11 IU/L (ref 13–69)
ALBUMIN/GLOBULIN RATIO: 1.17
ALBUMIN: 4.7 G/DL (ref 3.3–4.9)
ALKALINE PHOSPHATASE: 112 IU/L (ref 42–121)
ANION GAP: 21 (ref 8–16)
ASPARTATE AMINO TRANSFERASE: 20 IU/L (ref 15–46)
BASOPHIL #: 0 10^3/UL (ref 0–0.1)
BASOPHILS %: 0.3 % (ref 0–2)
BILIRUBIN,DIRECT: 0 MG/DL (ref 0–0.2)
BILIRUBIN,TOTAL: 0.4 MG/DL (ref 0.2–1.3)
BLOOD UREA NITROGEN: 37 MG/DL (ref 7–20)
CALCIUM: 9.1 MG/DL (ref 8.4–10.2)
CARBON DIOXIDE: 27 MMOL/L (ref 21–31)
CHLORIDE: 97 MMOL/L (ref 97–110)
CREATININE: 13.21 MG/DL (ref 0.61–1.24)
EOSINOPHILS #: 0 10^3/UL (ref 0–0.5)
EOSINOPHILS %: 0 % (ref 0–7)
GLOBULIN: 4 G/DL (ref 1.3–3.2)
GLUCOSE: 143 MG/DL (ref 70–220)
HEMATOCRIT: 39.4 % (ref 42–52)
HEMOGLOBIN: 13 G/DL (ref 14–18)
LIPASE: 26 U/L (ref 23–300)
LYMPHOCYTES #: 1.4 10^3/UL (ref 0.8–2.9)
LYMPHOCYTES %: 10 % (ref 15–51)
MEAN CORPUSCULAR HEMOGLOBIN: 29.2 PG (ref 29–33)
MEAN CORPUSCULAR HGB CONC: 33 G/DL (ref 32–37)
MEAN CORPUSCULAR VOLUME: 88.5 FL (ref 82–101)
MEAN PLATELET VOLUME: 9.5 FL (ref 7.4–10.4)
MONOCYTE #: 0.6 10^3/UL (ref 0.3–0.9)
MONOCYTES %: 4.2 % (ref 0–11)
NEUTROPHIL #: 11.6 10^3/UL (ref 1.6–7.5)
NEUTROPHILS %: 85.1 % (ref 39–77)
NUCLEATED RED BLOOD CELLS #: 0 10^3/UL (ref 0–0)
NUCLEATED RED BLOOD CELLS%: 0 /100WBC (ref 0–0)
PLATELET COUNT: 321 10^3/UL (ref 140–415)
POTASSIUM: 4.6 MMOL/L (ref 3.5–5.1)
RED BLOOD COUNT: 4.45 10^6/UL (ref 4.7–6.1)
RED CELL DISTRIBUTION WIDTH: 14.2 % (ref 11.5–14.5)
SODIUM: 140 MMOL/L (ref 135–144)
TOTAL PROTEIN: 8.7 G/DL (ref 6.1–8.1)
WHITE BLOOD COUNT: 13.7 10^3/UL (ref 4.8–10.8)

## 2018-07-29 PROCEDURE — 85025 COMPLETE CBC W/AUTO DIFF WBC: CPT

## 2018-07-29 PROCEDURE — 83036 HEMOGLOBIN GLYCOSYLATED A1C: CPT

## 2018-07-29 PROCEDURE — 96375 TX/PRO/DX INJ NEW DRUG ADDON: CPT

## 2018-07-29 PROCEDURE — 90935 HEMODIALYSIS ONE EVALUATION: CPT

## 2018-07-29 PROCEDURE — 97116 GAIT TRAINING THERAPY: CPT

## 2018-07-29 PROCEDURE — 86706 HEP B SURFACE ANTIBODY: CPT

## 2018-07-29 PROCEDURE — 83690 ASSAY OF LIPASE: CPT

## 2018-07-29 PROCEDURE — 87340 HEPATITIS B SURFACE AG IA: CPT

## 2018-07-29 PROCEDURE — 88305 TISSUE EXAM BY PATHOLOGIST: CPT

## 2018-07-29 PROCEDURE — 84100 ASSAY OF PHOSPHORUS: CPT

## 2018-07-29 PROCEDURE — 36415 COLL VENOUS BLD VENIPUNCTURE: CPT

## 2018-07-29 PROCEDURE — 97161 PT EVAL LOW COMPLEX 20 MIN: CPT

## 2018-07-29 PROCEDURE — 99285 EMERGENCY DEPT VISIT HI MDM: CPT

## 2018-07-29 PROCEDURE — 80048 BASIC METABOLIC PNL TOTAL CA: CPT

## 2018-07-29 PROCEDURE — 71045 X-RAY EXAM CHEST 1 VIEW: CPT

## 2018-07-29 PROCEDURE — 93306 TTE W/DOPPLER COMPLETE: CPT

## 2018-07-29 PROCEDURE — 83735 ASSAY OF MAGNESIUM: CPT

## 2018-07-29 PROCEDURE — 93005 ELECTROCARDIOGRAM TRACING: CPT

## 2018-07-29 PROCEDURE — 74176 CT ABD & PELVIS W/O CONTRAST: CPT

## 2018-07-29 PROCEDURE — 82962 GLUCOSE BLOOD TEST: CPT

## 2018-07-29 PROCEDURE — 84443 ASSAY THYROID STIM HORMONE: CPT

## 2018-07-29 PROCEDURE — 84439 ASSAY OF FREE THYROXINE: CPT

## 2018-07-29 PROCEDURE — 80053 COMPREHEN METABOLIC PANEL: CPT

## 2018-07-29 PROCEDURE — 96374 THER/PROPH/DIAG INJ IV PUSH: CPT

## 2018-07-29 RX ADMIN — ONDANSETRON HYDROCHLORIDE 1 MG: 2 INJECTION, SOLUTION INTRAMUSCULAR; INTRAVENOUS at 23:07

## 2018-07-29 RX ADMIN — LIDOCAINE HYDROCHLORIDE 1 MLS/HR: 10 INJECTION, SOLUTION EPIDURAL; INFILTRATION; INTRACAUDAL; PERINEURAL at 17:20

## 2018-07-29 RX ADMIN — KETOROLAC TROMETHAMINE 1 MG: 30 INJECTION, SOLUTION INTRAMUSCULAR at 23:06

## 2018-07-29 RX ADMIN — ONDANSETRON HYDROCHLORIDE 1 MG: 2 INJECTION, SOLUTION INTRAMUSCULAR; INTRAVENOUS at 22:11

## 2018-07-29 RX ADMIN — ONDANSETRON HYDROCHLORIDE 1 MG: 2 INJECTION, SOLUTION INTRAMUSCULAR; INTRAVENOUS at 17:20

## 2018-07-29 RX ADMIN — HYDRALAZINE HYDROCHLORIDE 1 MG: 20 INJECTION INTRAMUSCULAR; INTRAVENOUS at 21:30

## 2018-07-30 LAB
ADD MAN DIFF?: NO
ALANINE AMINOTRANSFERASE: 12 IU/L (ref 13–69)
ALBUMIN/GLOBULIN RATIO: 1.19
ALBUMIN: 4.3 G/DL (ref 3.3–4.9)
ALKALINE PHOSPHATASE: 91 IU/L (ref 42–121)
ANION GAP: 18 (ref 8–16)
ASPARTATE AMINO TRANSFERASE: 20 IU/L (ref 15–46)
BASOPHIL #: 0.1 10^3/UL (ref 0–0.1)
BASOPHILS %: 0.4 % (ref 0–2)
BILIRUBIN,DIRECT: 0 MG/DL (ref 0–0.2)
BILIRUBIN,TOTAL: 0.3 MG/DL (ref 0.2–1.3)
BLOOD UREA NITROGEN: 44 MG/DL (ref 7–20)
CALCIUM: 8.5 MG/DL (ref 8.4–10.2)
CARBON DIOXIDE: 29 MMOL/L (ref 21–31)
CHLORIDE: 98 MMOL/L (ref 97–110)
CREATININE: 14.22 MG/DL (ref 0.61–1.24)
EOSINOPHILS #: 0 10^3/UL (ref 0–0.5)
EOSINOPHILS %: 0 % (ref 0–7)
GLOBULIN: 3.6 G/DL (ref 1.3–3.2)
GLUCOSE: 98 MG/DL (ref 70–220)
HEMATOCRIT: 37.4 % (ref 42–52)
HEMOGLOBIN: 12 G/DL (ref 14–18)
HEPATITIS B SURFACE ANTIBODY: NEGATIVE
HEPATITIS B SURFACE ANTIGEN: NEGATIVE
LYMPHOCYTES #: 2.7 10^3/UL (ref 0.8–2.9)
LYMPHOCYTES %: 19.2 % (ref 15–51)
MEAN CORPUSCULAR HEMOGLOBIN: 29.2 PG (ref 29–33)
MEAN CORPUSCULAR HGB CONC: 32.1 G/DL (ref 32–37)
MEAN CORPUSCULAR VOLUME: 91 FL (ref 82–101)
MEAN PLATELET VOLUME: 10.2 FL (ref 7.4–10.4)
MONOCYTE #: 1.1 10^3/UL (ref 0.3–0.9)
MONOCYTES %: 7.7 % (ref 0–11)
NEUTROPHIL #: 10.2 10^3/UL (ref 1.6–7.5)
NEUTROPHILS %: 72.3 % (ref 39–77)
NUCLEATED RED BLOOD CELLS #: 0 10^3/UL (ref 0–0)
NUCLEATED RED BLOOD CELLS%: 0 /100WBC (ref 0–0)
PLATELET COUNT: 311 10^3/UL (ref 140–415)
POTASSIUM: 4.8 MMOL/L (ref 3.5–5.1)
RED BLOOD COUNT: 4.11 10^6/UL (ref 4.7–6.1)
RED CELL DISTRIBUTION WIDTH: 14.5 % (ref 11.5–14.5)
SODIUM: 140 MMOL/L (ref 135–144)
TOTAL PROTEIN: 7.9 G/DL (ref 6.1–8.1)
WHITE BLOOD COUNT: 14.1 10^3/UL (ref 4.8–10.8)

## 2018-07-30 RX ADMIN — HEPARIN SODIUM 1 UNIT: 1000 INJECTION, SOLUTION INTRAVENOUS; SUBCUTANEOUS at 21:11

## 2018-07-30 RX ADMIN — FOLIC ACID TAB 400 MCG 1 MG: 400 TAB at 14:00

## 2018-07-30 RX ADMIN — INSULIN GLARGINE 1 UNITS: 100 INJECTION, SOLUTION SUBCUTANEOUS at 21:00

## 2018-07-30 RX ADMIN — INSULIN ASPART 1 UNIT: 100 INJECTION, SOLUTION INTRAVENOUS; SUBCUTANEOUS at 17:55

## 2018-07-30 RX ADMIN — HYDROMORPHONE HYDROCHLORIDE 1 MG: 1 INJECTION, SOLUTION INTRAMUSCULAR; INTRAVENOUS; SUBCUTANEOUS at 22:17

## 2018-07-30 RX ADMIN — ONDANSETRON HYDROCHLORIDE 1 MG: 2 INJECTION, SOLUTION INTRAMUSCULAR; INTRAVENOUS at 23:28

## 2018-07-30 RX ADMIN — HYDRALAZINE HYDROCHLORIDE 1 MG: 20 INJECTION INTRAMUSCULAR; INTRAVENOUS at 16:05

## 2018-07-30 RX ADMIN — DOCUSATE SODIUM 1 MG: 100 CAPSULE, LIQUID FILLED ORAL at 22:09

## 2018-07-30 RX ADMIN — DOCUSATE SODIUM 1 MG: 100 CAPSULE, LIQUID FILLED ORAL at 13:43

## 2018-07-30 RX ADMIN — HYDROCODONE BITARTRATE AND ACETAMINOPHEN 1 TAB: 5; 325 TABLET ORAL at 13:42

## 2018-07-30 RX ADMIN — ONDANSETRON HYDROCHLORIDE 1 MG: 2 INJECTION, SOLUTION INTRAMUSCULAR; INTRAVENOUS at 11:04

## 2018-07-30 RX ADMIN — CALCIUM CARBONATE (ANTACID) CHEW TAB 500 MG 1 MG: 500 CHEW TAB at 13:42

## 2018-07-30 RX ADMIN — ONDANSETRON HYDROCHLORIDE 1 MG: 2 INJECTION, SOLUTION INTRAMUSCULAR; INTRAVENOUS at 18:45

## 2018-07-30 RX ADMIN — FERROUS SULFATE TAB 325 MG (65 MG ELEMENTAL FE) 1 MG: 325 (65 FE) TAB at 22:08

## 2018-07-30 RX ADMIN — CALCIUM CARBONATE (ANTACID) CHEW TAB 500 MG 1 MG: 500 CHEW TAB at 22:08

## 2018-07-30 RX ADMIN — INSULIN ASPART 1 UNIT: 100 INJECTION, SOLUTION INTRAVENOUS; SUBCUTANEOUS at 11:50

## 2018-07-30 RX ADMIN — FERROUS SULFATE TAB 325 MG (65 MG ELEMENTAL FE) 1 MG: 325 (65 FE) TAB at 13:42

## 2018-07-30 RX ADMIN — KETOROLAC TROMETHAMINE 1 MG: 30 INJECTION, SOLUTION INTRAMUSCULAR at 11:32

## 2018-07-30 RX ADMIN — Medication 1 TAB: at 13:43

## 2018-07-30 RX ADMIN — PREGABALIN 1 MG: 25 CAPSULE ORAL at 13:43

## 2018-07-30 RX ADMIN — INSULIN ASPART 1 UNIT: 100 INJECTION, SOLUTION INTRAVENOUS; SUBCUTANEOUS at 21:00

## 2018-07-31 LAB
ADD MAN DIFF?: NO
ANION GAP: 23 (ref 8–16)
BASOPHIL #: 0.1 10^3/UL (ref 0–0.1)
BASOPHILS %: 0.6 % (ref 0–2)
BLOOD UREA NITROGEN: 32 MG/DL (ref 7–20)
CALCIUM: 9.8 MG/DL (ref 8.4–10.2)
CARBON DIOXIDE: 28 MMOL/L (ref 21–31)
CHLORIDE: 95 MMOL/L (ref 97–110)
CREATININE: 11.42 MG/DL (ref 0.61–1.24)
EOSINOPHILS #: 0 10^3/UL (ref 0–0.5)
EOSINOPHILS %: 0 % (ref 0–7)
GLUCOSE: 121 MG/DL (ref 70–220)
HEMATOCRIT: 44.4 % (ref 42–52)
HEMOGLOBIN: 14 G/DL (ref 14–18)
LYMPHOCYTES #: 3.1 10^3/UL (ref 0.8–2.9)
LYMPHOCYTES %: 20.9 % (ref 15–51)
MEAN CORPUSCULAR HEMOGLOBIN: 28.6 PG (ref 29–33)
MEAN CORPUSCULAR HGB CONC: 31.5 G/DL (ref 32–37)
MEAN CORPUSCULAR VOLUME: 90.8 FL (ref 82–101)
MEAN PLATELET VOLUME: 10.5 FL (ref 7.4–10.4)
MONOCYTE #: 0.9 10^3/UL (ref 0.3–0.9)
MONOCYTES %: 6 % (ref 0–11)
NEUTROPHIL #: 10.6 10^3/UL (ref 1.6–7.5)
NEUTROPHILS %: 72.2 % (ref 39–77)
NUCLEATED RED BLOOD CELLS #: 0 10^3/UL (ref 0–0)
NUCLEATED RED BLOOD CELLS%: 0 /100WBC (ref 0–0)
PLATELET COUNT: 400 10^3/UL (ref 140–415)
POTASSIUM: 5.4 MMOL/L (ref 3.5–5.1)
RED BLOOD COUNT: 4.89 10^6/UL (ref 4.7–6.1)
RED CELL DISTRIBUTION WIDTH: 14.3 % (ref 11.5–14.5)
SODIUM: 141 MMOL/L (ref 135–144)
WHITE BLOOD COUNT: 14.8 10^3/UL (ref 4.8–10.8)

## 2018-07-31 RX ADMIN — INSULIN ASPART 1 UNIT: 100 INJECTION, SOLUTION INTRAVENOUS; SUBCUTANEOUS at 11:50

## 2018-07-31 RX ADMIN — DOCUSATE SODIUM 1 MG: 100 CAPSULE, LIQUID FILLED ORAL at 08:45

## 2018-07-31 RX ADMIN — CALCIUM CARBONATE (ANTACID) CHEW TAB 500 MG 1 MG: 500 CHEW TAB at 12:09

## 2018-07-31 RX ADMIN — FERROUS SULFATE TAB 325 MG (65 MG ELEMENTAL FE) 1 MG: 325 (65 FE) TAB at 21:00

## 2018-07-31 RX ADMIN — HYDROMORPHONE HYDROCHLORIDE 1 MG: 1 INJECTION, SOLUTION INTRAMUSCULAR; INTRAVENOUS; SUBCUTANEOUS at 17:42

## 2018-07-31 RX ADMIN — INSULIN ASPART 1 UNIT: 100 INJECTION, SOLUTION INTRAVENOUS; SUBCUTANEOUS at 21:00

## 2018-07-31 RX ADMIN — FERROUS SULFATE TAB 325 MG (65 MG ELEMENTAL FE) 1 MG: 325 (65 FE) TAB at 08:45

## 2018-07-31 RX ADMIN — PANTOPRAZOLE SODIUM 1 MG: 40 TABLET, DELAYED RELEASE ORAL at 06:46

## 2018-07-31 RX ADMIN — HYDROMORPHONE HYDROCHLORIDE 1 MG: 1 INJECTION, SOLUTION INTRAMUSCULAR; INTRAVENOUS; SUBCUTANEOUS at 12:36

## 2018-07-31 RX ADMIN — SUCRALFATE 1 GM: 1 TABLET ORAL at 23:22

## 2018-07-31 RX ADMIN — DOCUSATE SODIUM 1 MG: 100 CAPSULE, LIQUID FILLED ORAL at 09:00

## 2018-07-31 RX ADMIN — PREGABALIN 1 MG: 25 CAPSULE ORAL at 08:48

## 2018-07-31 RX ADMIN — ONDANSETRON HYDROCHLORIDE 1 MG: 2 INJECTION, SOLUTION INTRAMUSCULAR; INTRAVENOUS at 06:45

## 2018-07-31 RX ADMIN — SUCRALFATE 1 GM: 1 TABLET ORAL at 17:42

## 2018-07-31 RX ADMIN — CALCIUM CARBONATE (ANTACID) CHEW TAB 500 MG 1 MG: 500 CHEW TAB at 08:44

## 2018-07-31 RX ADMIN — DOCUSATE SODIUM 1 MG: 100 CAPSULE, LIQUID FILLED ORAL at 22:38

## 2018-07-31 RX ADMIN — HEPARIN SODIUM 1 UNIT: 5000 INJECTION, SOLUTION INTRAVENOUS; SUBCUTANEOUS at 21:00

## 2018-07-31 RX ADMIN — PREGABALIN 1 MG: 25 CAPSULE ORAL at 09:00

## 2018-07-31 RX ADMIN — HYDROMORPHONE HYDROCHLORIDE 1 MG: 1 INJECTION, SOLUTION INTRAMUSCULAR; INTRAVENOUS; SUBCUTANEOUS at 23:21

## 2018-07-31 RX ADMIN — FOLIC ACID TAB 400 MCG 1 MG: 400 TAB at 09:00

## 2018-07-31 RX ADMIN — INSULIN ASPART 1 UNIT: 100 INJECTION, SOLUTION INTRAVENOUS; SUBCUTANEOUS at 17:41

## 2018-07-31 RX ADMIN — Medication 1 TAB: at 08:45

## 2018-07-31 RX ADMIN — ALPRAZOLAM 1 MG: 0.5 TABLET ORAL at 06:45

## 2018-07-31 RX ADMIN — INSULIN ASPART 1 UNIT: 100 INJECTION, SOLUTION INTRAVENOUS; SUBCUTANEOUS at 07:55

## 2018-07-31 RX ADMIN — FERROUS SULFATE TAB 325 MG (65 MG ELEMENTAL FE) 1 MG: 325 (65 FE) TAB at 09:00

## 2018-07-31 RX ADMIN — INSULIN GLARGINE 1 UNITS: 100 INJECTION, SOLUTION SUBCUTANEOUS at 21:00

## 2018-07-31 RX ADMIN — FOLIC ACID TAB 400 MCG 1 MG: 400 TAB at 08:44

## 2018-07-31 RX ADMIN — Medication 1 TAB: at 09:00

## 2018-07-31 RX ADMIN — HEPARIN SODIUM 1 UNIT: 1000 INJECTION, SOLUTION INTRAVENOUS; SUBCUTANEOUS at 17:52

## 2018-07-31 RX ADMIN — CALCIUM CARBONATE (ANTACID) CHEW TAB 500 MG 1 MG: 500 CHEW TAB at 22:39

## 2018-07-31 RX ADMIN — METOCLOPRAMIDE HYDROCHLORIDE 1 MG: 10 INJECTION, SOLUTION INTRAMUSCULAR; INTRAVENOUS at 17:42

## 2018-07-31 RX ADMIN — HYDROMORPHONE HYDROCHLORIDE 1 MG: 1 INJECTION, SOLUTION INTRAMUSCULAR; INTRAVENOUS; SUBCUTANEOUS at 08:43

## 2018-07-31 RX ADMIN — PANTOPRAZOLE SODIUM 1 MG: 40 INJECTION, POWDER, FOR SOLUTION INTRAVENOUS at 17:42

## 2018-07-31 RX ADMIN — METOCLOPRAMIDE HYDROCHLORIDE 1 MG: 10 INJECTION, SOLUTION INTRAMUSCULAR; INTRAVENOUS at 23:22

## 2018-08-01 LAB
ADD MAN DIFF?: NO
ANION GAP: 20 (ref 8–16)
BASOPHIL #: 0.1 10^3/UL (ref 0–0.1)
BASOPHILS %: 0.5 % (ref 0–2)
BLOOD UREA NITROGEN: 29 MG/DL (ref 7–20)
CALCIUM: 9.1 MG/DL (ref 8.4–10.2)
CARBON DIOXIDE: 28 MMOL/L (ref 21–31)
CHLORIDE: 96 MMOL/L (ref 97–110)
CREATININE: 9.88 MG/DL (ref 0.61–1.24)
EOSINOPHILS #: 0 10^3/UL (ref 0–0.5)
EOSINOPHILS %: 0 % (ref 0–7)
GLUCOSE: 98 MG/DL (ref 70–220)
HEMATOCRIT: 41.6 % (ref 42–52)
HEMOGLOBIN: 13.4 G/DL (ref 14–18)
LYMPHOCYTES #: 2.4 10^3/UL (ref 0.8–2.9)
LYMPHOCYTES %: 16.5 % (ref 15–51)
MAGNESIUM: 2.2 MG/DL (ref 1.7–2.5)
MEAN CORPUSCULAR HEMOGLOBIN: 29 PG (ref 29–33)
MEAN CORPUSCULAR HGB CONC: 32.2 G/DL (ref 32–37)
MEAN CORPUSCULAR VOLUME: 90 FL (ref 82–101)
MEAN PLATELET VOLUME: 10.4 FL (ref 7.4–10.4)
MONOCYTE #: 1.1 10^3/UL (ref 0.3–0.9)
MONOCYTES %: 7.4 % (ref 0–11)
NEUTROPHIL #: 11.2 10^3/UL (ref 1.6–7.5)
NEUTROPHILS %: 75.3 % (ref 39–77)
NUCLEATED RED BLOOD CELLS #: 0 10^3/UL (ref 0–0)
NUCLEATED RED BLOOD CELLS%: 0 /100WBC (ref 0–0)
PHOSPHORUS: 7.2 MG/DL (ref 2.5–4.9)
PLATELET COUNT: 384 10^3/UL (ref 140–415)
POTASSIUM: 5 MMOL/L (ref 3.5–5.1)
RED BLOOD COUNT: 4.62 10^6/UL (ref 4.7–6.1)
RED CELL DISTRIBUTION WIDTH: 13.9 % (ref 11.5–14.5)
SODIUM: 139 MMOL/L (ref 135–144)
WHITE BLOOD COUNT: 14.8 10^3/UL (ref 4.8–10.8)

## 2018-08-01 PROCEDURE — 0DB38ZX EXCISION OF LOWER ESOPHAGUS, VIA NATURAL OR ARTIFICIAL OPENING ENDOSCOPIC, DIAGNOSTIC: ICD-10-PCS

## 2018-08-01 PROCEDURE — 5A1D70Z PERFORMANCE OF URINARY FILTRATION, INTERMITTENT, LESS THAN 6 HOURS PER DAY: ICD-10-PCS

## 2018-08-01 PROCEDURE — 0DB68ZX EXCISION OF STOMACH, VIA NATURAL OR ARTIFICIAL OPENING ENDOSCOPIC, DIAGNOSTIC: ICD-10-PCS

## 2018-08-01 RX ADMIN — PREGABALIN 1 MG: 25 CAPSULE ORAL at 08:12

## 2018-08-01 RX ADMIN — PANTOPRAZOLE SODIUM 1 MG: 40 INJECTION, POWDER, FOR SOLUTION INTRAVENOUS at 06:58

## 2018-08-01 RX ADMIN — SEVELAMER CARBONATE 1 MG: 800 TABLET, FILM COATED ORAL at 11:41

## 2018-08-01 RX ADMIN — SUCRALFATE 1 GM: 1 TABLET ORAL at 11:41

## 2018-08-01 RX ADMIN — PANTOPRAZOLE SODIUM 1 MG: 40 INJECTION, POWDER, FOR SOLUTION INTRAVENOUS at 18:08

## 2018-08-01 RX ADMIN — INSULIN ASPART 1 UNIT: 100 INJECTION, SOLUTION INTRAVENOUS; SUBCUTANEOUS at 17:55

## 2018-08-01 RX ADMIN — INSULIN ASPART 1 UNIT: 100 INJECTION, SOLUTION INTRAVENOUS; SUBCUTANEOUS at 20:38

## 2018-08-01 RX ADMIN — DOCUSATE SODIUM 1 MG: 100 CAPSULE, LIQUID FILLED ORAL at 20:23

## 2018-08-01 RX ADMIN — LABETALOL HYDROCHLORIDE 1 MG: 5 INJECTION, SOLUTION INTRAVENOUS at 17:45

## 2018-08-01 RX ADMIN — INSULIN ASPART 1 UNIT: 100 INJECTION, SOLUTION INTRAVENOUS; SUBCUTANEOUS at 11:50

## 2018-08-01 RX ADMIN — DOCUSATE SODIUM 1 MG: 100 CAPSULE, LIQUID FILLED ORAL at 08:11

## 2018-08-01 RX ADMIN — CALCIUM CARBONATE (ANTACID) CHEW TAB 500 MG 1 MG: 500 CHEW TAB at 12:08

## 2018-08-01 RX ADMIN — CALCIUM CARBONATE (ANTACID) CHEW TAB 500 MG 1 MG: 500 CHEW TAB at 20:23

## 2018-08-01 RX ADMIN — SEVELAMER CARBONATE 1 MG: 800 TABLET, FILM COATED ORAL at 18:08

## 2018-08-01 RX ADMIN — METOCLOPRAMIDE HYDROCHLORIDE 1 MG: 10 INJECTION, SOLUTION INTRAMUSCULAR; INTRAVENOUS at 11:44

## 2018-08-01 RX ADMIN — CALCIUM CARBONATE (ANTACID) CHEW TAB 500 MG 1 MG: 500 CHEW TAB at 08:12

## 2018-08-01 RX ADMIN — SUCRALFATE 1 GM: 1 TABLET ORAL at 06:00

## 2018-08-01 RX ADMIN — HEPARIN SODIUM 1 UNIT: 5000 INJECTION, SOLUTION INTRAVENOUS; SUBCUTANEOUS at 08:12

## 2018-08-01 RX ADMIN — FERROUS SULFATE TAB 325 MG (65 MG ELEMENTAL FE) 1 MG: 325 (65 FE) TAB at 08:12

## 2018-08-01 RX ADMIN — METOCLOPRAMIDE HYDROCHLORIDE 1 MG: 10 INJECTION, SOLUTION INTRAMUSCULAR; INTRAVENOUS at 18:08

## 2018-08-01 RX ADMIN — SUCRALFATE 1 GM: 1 SUSPENSION ORAL at 20:24

## 2018-08-01 RX ADMIN — Medication 1 TAB: at 08:12

## 2018-08-01 RX ADMIN — FOLIC ACID TAB 400 MCG 1 MG: 400 TAB at 08:12

## 2018-08-01 RX ADMIN — FERROUS SULFATE TAB 325 MG (65 MG ELEMENTAL FE) 1 MG: 325 (65 FE) TAB at 20:24

## 2018-08-01 RX ADMIN — SUCRALFATE 1 GM: 1 SUSPENSION ORAL at 18:08

## 2018-08-01 RX ADMIN — HEPARIN SODIUM 1 UNIT: 5000 INJECTION, SOLUTION INTRAVENOUS; SUBCUTANEOUS at 20:37

## 2018-08-01 RX ADMIN — HYDROMORPHONE HYDROCHLORIDE 1 MG: 1 INJECTION, SOLUTION INTRAMUSCULAR; INTRAVENOUS; SUBCUTANEOUS at 11:44

## 2018-08-01 RX ADMIN — INSULIN ASPART 1 UNIT: 100 INJECTION, SOLUTION INTRAVENOUS; SUBCUTANEOUS at 07:55

## 2018-08-01 RX ADMIN — METOCLOPRAMIDE HYDROCHLORIDE 1 MG: 10 INJECTION, SOLUTION INTRAMUSCULAR; INTRAVENOUS at 06:58

## 2018-08-01 RX ADMIN — INSULIN GLARGINE 1 UNITS: 100 INJECTION, SOLUTION SUBCUTANEOUS at 20:38

## 2018-08-02 LAB
ADD MAN DIFF?: NO
ANION GAP: 21 (ref 8–16)
BASOPHIL #: 0.1 10^3/UL (ref 0–0.1)
BASOPHILS %: 0.7 % (ref 0–2)
BLOOD UREA NITROGEN: 50 MG/DL (ref 7–20)
CALCIUM: 9.1 MG/DL (ref 8.4–10.2)
CARBON DIOXIDE: 25 MMOL/L (ref 21–31)
CHLORIDE: 97 MMOL/L (ref 97–110)
CREATININE: 12.97 MG/DL (ref 0.61–1.24)
EOSINOPHILS #: 0 10^3/UL (ref 0–0.5)
EOSINOPHILS %: 0.2 % (ref 0–7)
FREE T4 (FREE THYROXINE): 1.36 NG/DL (ref 0.79–2.35)
GLUCOSE: 79 MG/DL (ref 70–220)
HEMATOCRIT: 40.3 % (ref 42–52)
HEMOGLOBIN: 13.2 G/DL (ref 14–18)
LYMPHOCYTES #: 3.9 10^3/UL (ref 0.8–2.9)
LYMPHOCYTES %: 29.6 % (ref 15–51)
MAGNESIUM: 2.3 MG/DL (ref 1.7–2.5)
MEAN CORPUSCULAR HEMOGLOBIN: 29.3 PG (ref 29–33)
MEAN CORPUSCULAR HGB CONC: 32.8 G/DL (ref 32–37)
MEAN CORPUSCULAR VOLUME: 89.4 FL (ref 82–101)
MEAN PLATELET VOLUME: 10.2 FL (ref 7.4–10.4)
MONOCYTE #: 1.3 10^3/UL (ref 0.3–0.9)
MONOCYTES %: 9.6 % (ref 0–11)
NEUTROPHIL #: 7.8 10^3/UL (ref 1.6–7.5)
NEUTROPHILS %: 59.5 % (ref 39–77)
NUCLEATED RED BLOOD CELLS #: 0 10^3/UL (ref 0–0)
NUCLEATED RED BLOOD CELLS%: 0 /100WBC (ref 0–0)
PLATELET COUNT: 372 10^3/UL (ref 140–415)
POTASSIUM: 4.7 MMOL/L (ref 3.5–5.1)
RED BLOOD COUNT: 4.51 10^6/UL (ref 4.7–6.1)
RED CELL DISTRIBUTION WIDTH: 13.5 % (ref 11.5–14.5)
SODIUM: 138 MMOL/L (ref 135–144)
THYROID STIMULATING HORMONE: 2.38 MIU/L (ref 0.47–4.68)
WHITE BLOOD COUNT: 13 10^3/UL (ref 4.8–10.8)

## 2018-08-02 RX ADMIN — SUCRALFATE 1 GM: 1 SUSPENSION ORAL at 20:10

## 2018-08-02 RX ADMIN — DOCUSATE SODIUM 1 MG: 100 CAPSULE, LIQUID FILLED ORAL at 07:46

## 2018-08-02 RX ADMIN — METOCLOPRAMIDE HYDROCHLORIDE 1 MG: 10 INJECTION, SOLUTION INTRAMUSCULAR; INTRAVENOUS at 17:08

## 2018-08-02 RX ADMIN — HEPARIN SODIUM 1 UNIT: 5000 INJECTION, SOLUTION INTRAVENOUS; SUBCUTANEOUS at 07:53

## 2018-08-02 RX ADMIN — CALCIUM CARBONATE (ANTACID) CHEW TAB 500 MG 1 MG: 500 CHEW TAB at 20:10

## 2018-08-02 RX ADMIN — METOCLOPRAMIDE HYDROCHLORIDE 1 MG: 10 INJECTION, SOLUTION INTRAMUSCULAR; INTRAVENOUS at 05:39

## 2018-08-02 RX ADMIN — DOCUSATE SODIUM 1 MG: 100 CAPSULE, LIQUID FILLED ORAL at 20:10

## 2018-08-02 RX ADMIN — METOCLOPRAMIDE HYDROCHLORIDE 1 MG: 10 INJECTION, SOLUTION INTRAMUSCULAR; INTRAVENOUS at 11:29

## 2018-08-02 RX ADMIN — PANTOPRAZOLE SODIUM 1 MG: 40 INJECTION, POWDER, FOR SOLUTION INTRAVENOUS at 17:07

## 2018-08-02 RX ADMIN — SUCRALFATE 1 GM: 1 SUSPENSION ORAL at 07:44

## 2018-08-02 RX ADMIN — SUCRALFATE 1 GM: 1 SUSPENSION ORAL at 17:07

## 2018-08-02 RX ADMIN — PANTOPRAZOLE SODIUM 1 MG: 40 INJECTION, POWDER, FOR SOLUTION INTRAVENOUS at 05:39

## 2018-08-02 RX ADMIN — CALCIUM CARBONATE (ANTACID) CHEW TAB 500 MG 1 MG: 500 CHEW TAB at 12:52

## 2018-08-02 RX ADMIN — FERROUS SULFATE TAB 325 MG (65 MG ELEMENTAL FE) 1 MG: 325 (65 FE) TAB at 07:46

## 2018-08-02 RX ADMIN — FOLIC ACID TAB 400 MCG 1 MG: 400 TAB at 07:46

## 2018-08-02 RX ADMIN — HEPARIN SODIUM 1 UNIT: 5000 INJECTION, SOLUTION INTRAVENOUS; SUBCUTANEOUS at 20:23

## 2018-08-02 RX ADMIN — SUCRALFATE 1 GM: 1 SUSPENSION ORAL at 12:52

## 2018-08-02 RX ADMIN — INSULIN GLARGINE 1 UNITS: 100 INJECTION, SOLUTION SUBCUTANEOUS at 20:22

## 2018-08-02 RX ADMIN — METOCLOPRAMIDE HYDROCHLORIDE 1 MG: 10 INJECTION, SOLUTION INTRAMUSCULAR; INTRAVENOUS at 00:55

## 2018-08-02 RX ADMIN — HEPARIN SODIUM 1 UNIT: 1000 INJECTION, SOLUTION INTRAVENOUS; SUBCUTANEOUS at 20:09

## 2018-08-02 RX ADMIN — CALCIUM CARBONATE (ANTACID) CHEW TAB 500 MG 1 MG: 500 CHEW TAB at 07:45

## 2018-08-02 RX ADMIN — INSULIN ASPART 1 UNIT: 100 INJECTION, SOLUTION INTRAVENOUS; SUBCUTANEOUS at 11:29

## 2018-08-02 RX ADMIN — INSULIN ASPART 1 UNIT: 100 INJECTION, SOLUTION INTRAVENOUS; SUBCUTANEOUS at 17:08

## 2018-08-02 RX ADMIN — SEVELAMER CARBONATE 1 MG: 800 TABLET, FILM COATED ORAL at 17:07

## 2018-08-02 RX ADMIN — Medication 1 TAB: at 07:45

## 2018-08-02 RX ADMIN — INSULIN ASPART 1 UNIT: 100 INJECTION, SOLUTION INTRAVENOUS; SUBCUTANEOUS at 07:44

## 2018-08-02 RX ADMIN — SEVELAMER CARBONATE 1 MG: 800 TABLET, FILM COATED ORAL at 11:29

## 2018-08-02 RX ADMIN — SEVELAMER CARBONATE 1 MG: 800 TABLET, FILM COATED ORAL at 07:46

## 2018-08-02 RX ADMIN — INSULIN ASPART 1 UNIT: 100 INJECTION, SOLUTION INTRAVENOUS; SUBCUTANEOUS at 20:27

## 2018-08-02 RX ADMIN — FERROUS SULFATE TAB 325 MG (65 MG ELEMENTAL FE) 1 MG: 325 (65 FE) TAB at 20:10

## 2018-08-02 RX ADMIN — PREGABALIN 1 MG: 25 CAPSULE ORAL at 07:48

## 2018-08-03 RX ADMIN — FERROUS SULFATE TAB 325 MG (65 MG ELEMENTAL FE) 1 MG: 325 (65 FE) TAB at 07:55

## 2018-08-03 RX ADMIN — INSULIN ASPART 1 UNIT: 100 INJECTION, SOLUTION INTRAVENOUS; SUBCUTANEOUS at 07:54

## 2018-08-03 RX ADMIN — SEVELAMER CARBONATE 1 MG: 800 TABLET, FILM COATED ORAL at 16:48

## 2018-08-03 RX ADMIN — DOCUSATE SODIUM 1 MG: 100 CAPSULE, LIQUID FILLED ORAL at 21:03

## 2018-08-03 RX ADMIN — SEVELAMER CARBONATE 1 MG: 800 TABLET, FILM COATED ORAL at 07:55

## 2018-08-03 RX ADMIN — INSULIN ASPART 1 UNIT: 100 INJECTION, SOLUTION INTRAVENOUS; SUBCUTANEOUS at 21:06

## 2018-08-03 RX ADMIN — LIDOCAINE HYDROCHLORIDE 1 MG: 20 INJECTION, SOLUTION INTRAVENOUS at 22:58

## 2018-08-03 RX ADMIN — METOCLOPRAMIDE HYDROCHLORIDE 1 MG: 10 INJECTION, SOLUTION INTRAMUSCULAR; INTRAVENOUS at 16:58

## 2018-08-03 RX ADMIN — CALCIUM CARBONATE (ANTACID) CHEW TAB 500 MG 1 MG: 500 CHEW TAB at 12:09

## 2018-08-03 RX ADMIN — CALCIUM CARBONATE (ANTACID) CHEW TAB 500 MG 1 MG: 500 CHEW TAB at 21:04

## 2018-08-03 RX ADMIN — SUCRALFATE 1 GM: 1 SUSPENSION ORAL at 12:09

## 2018-08-03 RX ADMIN — INSULIN ASPART 1 UNIT: 100 INJECTION, SOLUTION INTRAVENOUS; SUBCUTANEOUS at 16:52

## 2018-08-03 RX ADMIN — INSULIN GLARGINE 1 UNITS: 100 INJECTION, SOLUTION SUBCUTANEOUS at 21:16

## 2018-08-03 RX ADMIN — PANTOPRAZOLE SODIUM 1 MG: 40 INJECTION, POWDER, FOR SOLUTION INTRAVENOUS at 05:25

## 2018-08-03 RX ADMIN — HEPARIN SODIUM 1 UNIT: 5000 INJECTION, SOLUTION INTRAVENOUS; SUBCUTANEOUS at 21:16

## 2018-08-03 RX ADMIN — INSULIN ASPART 1 UNIT: 100 INJECTION, SOLUTION INTRAVENOUS; SUBCUTANEOUS at 11:50

## 2018-08-03 RX ADMIN — SUCRALFATE 1 GM: 1 SUSPENSION ORAL at 07:54

## 2018-08-03 RX ADMIN — CALCIUM CARBONATE (ANTACID) CHEW TAB 500 MG 1 MG: 500 CHEW TAB at 07:54

## 2018-08-03 RX ADMIN — PROPOFOL 1: 10 INJECTION, EMULSION INTRAVENOUS at 22:57

## 2018-08-03 RX ADMIN — FOLIC ACID TAB 400 MCG 1 MG: 400 TAB at 07:54

## 2018-08-03 RX ADMIN — SUCRALFATE 1 GM: 1 SUSPENSION ORAL at 16:48

## 2018-08-03 RX ADMIN — Medication 1 MCG: at 22:58

## 2018-08-03 RX ADMIN — DOCUSATE SODIUM 1 MG: 100 CAPSULE, LIQUID FILLED ORAL at 07:55

## 2018-08-03 RX ADMIN — PANTOPRAZOLE SODIUM 1 MG: 40 INJECTION, POWDER, FOR SOLUTION INTRAVENOUS at 16:49

## 2018-08-03 RX ADMIN — HEPARIN SODIUM 1 UNIT: 5000 INJECTION, SOLUTION INTRAVENOUS; SUBCUTANEOUS at 08:05

## 2018-08-03 RX ADMIN — SEVELAMER CARBONATE 1 MG: 800 TABLET, FILM COATED ORAL at 12:09

## 2018-08-03 RX ADMIN — PREGABALIN 1 MG: 25 CAPSULE ORAL at 07:54

## 2018-08-03 RX ADMIN — METOCLOPRAMIDE HYDROCHLORIDE 1 MG: 10 INJECTION, SOLUTION INTRAMUSCULAR; INTRAVENOUS at 00:21

## 2018-08-03 RX ADMIN — HYDROCODONE BITARTRATE AND ACETAMINOPHEN 1 TAB: 5; 325 TABLET ORAL at 21:06

## 2018-08-03 RX ADMIN — METOCLOPRAMIDE HYDROCHLORIDE 1 MG: 10 INJECTION, SOLUTION INTRAMUSCULAR; INTRAVENOUS at 12:09

## 2018-08-03 RX ADMIN — Medication 1 TAB: at 07:55

## 2018-08-03 RX ADMIN — SUCRALFATE 1 GM: 1 SUSPENSION ORAL at 21:03

## 2018-08-03 RX ADMIN — METOCLOPRAMIDE HYDROCHLORIDE 1 MG: 10 INJECTION, SOLUTION INTRAMUSCULAR; INTRAVENOUS at 05:25

## 2018-08-03 RX ADMIN — MIDAZOLAM HYDROCHLORIDE 1 MG: 2 INJECTION, SOLUTION INTRAMUSCULAR; INTRAVENOUS at 22:57

## 2018-08-03 RX ADMIN — FERROUS SULFATE TAB 325 MG (65 MG ELEMENTAL FE) 1 MG: 325 (65 FE) TAB at 21:04

## 2018-08-04 RX ADMIN — SEVELAMER CARBONATE 1 MG: 800 TABLET, FILM COATED ORAL at 07:50

## 2018-08-04 RX ADMIN — ALPRAZOLAM 1 MG: 0.5 TABLET ORAL at 03:39

## 2018-08-04 RX ADMIN — METOCLOPRAMIDE HYDROCHLORIDE 1 MG: 10 INJECTION, SOLUTION INTRAMUSCULAR; INTRAVENOUS at 18:47

## 2018-08-04 RX ADMIN — CALCIUM CARBONATE (ANTACID) CHEW TAB 500 MG 1 MG: 500 CHEW TAB at 21:00

## 2018-08-04 RX ADMIN — PANTOPRAZOLE SODIUM 1 MG: 40 INJECTION, POWDER, FOR SOLUTION INTRAVENOUS at 18:47

## 2018-08-04 RX ADMIN — DOCUSATE SODIUM 1 MG: 100 CAPSULE, LIQUID FILLED ORAL at 13:26

## 2018-08-04 RX ADMIN — INSULIN GLARGINE 1 UNITS: 100 INJECTION, SOLUTION SUBCUTANEOUS at 20:09

## 2018-08-04 RX ADMIN — DOCUSATE SODIUM 1 MG: 100 CAPSULE, LIQUID FILLED ORAL at 21:00

## 2018-08-04 RX ADMIN — HEPARIN SODIUM 1 UNIT: 5000 INJECTION, SOLUTION INTRAVENOUS; SUBCUTANEOUS at 13:30

## 2018-08-04 RX ADMIN — METOCLOPRAMIDE HYDROCHLORIDE 1 MG: 10 INJECTION, SOLUTION INTRAMUSCULAR; INTRAVENOUS at 00:18

## 2018-08-04 RX ADMIN — INSULIN ASPART 1 UNIT: 100 INJECTION, SOLUTION INTRAVENOUS; SUBCUTANEOUS at 20:41

## 2018-08-04 RX ADMIN — METOCLOPRAMIDE HYDROCHLORIDE 1 MG: 10 INJECTION, SOLUTION INTRAMUSCULAR; INTRAVENOUS at 13:35

## 2018-08-04 RX ADMIN — SEVELAMER CARBONATE 1 MG: 800 TABLET, FILM COATED ORAL at 13:26

## 2018-08-04 RX ADMIN — SUCRALFATE 1 GM: 1 SUSPENSION ORAL at 09:00

## 2018-08-04 RX ADMIN — CALCIUM CARBONATE (ANTACID) CHEW TAB 500 MG 1 MG: 500 CHEW TAB at 13:36

## 2018-08-04 RX ADMIN — SUCRALFATE 1 GM: 1 SUSPENSION ORAL at 21:00

## 2018-08-04 RX ADMIN — HEPARIN SODIUM 1 UNIT: 1000 INJECTION, SOLUTION INTRAVENOUS; SUBCUTANEOUS at 13:16

## 2018-08-04 RX ADMIN — Medication 1 TAB: at 13:29

## 2018-08-04 RX ADMIN — SUCRALFATE 1 GM: 1 SUSPENSION ORAL at 13:36

## 2018-08-04 RX ADMIN — PREGABALIN 1 MG: 25 CAPSULE ORAL at 13:25

## 2018-08-04 RX ADMIN — FERROUS SULFATE TAB 325 MG (65 MG ELEMENTAL FE) 1 MG: 325 (65 FE) TAB at 21:00

## 2018-08-04 RX ADMIN — METOCLOPRAMIDE HYDROCHLORIDE 1 MG: 10 INJECTION, SOLUTION INTRAMUSCULAR; INTRAVENOUS at 06:41

## 2018-08-04 RX ADMIN — FOLIC ACID TAB 400 MCG 1 MG: 400 TAB at 13:29

## 2018-08-04 RX ADMIN — PANTOPRAZOLE SODIUM 1 MG: 40 INJECTION, POWDER, FOR SOLUTION INTRAVENOUS at 06:40

## 2018-08-04 RX ADMIN — INSULIN ASPART 1 UNIT: 100 INJECTION, SOLUTION INTRAVENOUS; SUBCUTANEOUS at 07:50

## 2018-08-04 RX ADMIN — SEVELAMER CARBONATE 1 MG: 800 TABLET, FILM COATED ORAL at 18:46

## 2018-08-04 RX ADMIN — INSULIN ASPART 1 UNIT: 100 INJECTION, SOLUTION INTRAVENOUS; SUBCUTANEOUS at 17:55

## 2018-08-04 RX ADMIN — FERROUS SULFATE TAB 325 MG (65 MG ELEMENTAL FE) 1 MG: 325 (65 FE) TAB at 13:26

## 2018-08-04 RX ADMIN — INSULIN ASPART 1 UNIT: 100 INJECTION, SOLUTION INTRAVENOUS; SUBCUTANEOUS at 11:40

## 2018-08-04 RX ADMIN — HEPARIN SODIUM 1 UNIT: 5000 INJECTION, SOLUTION INTRAVENOUS; SUBCUTANEOUS at 21:02

## 2018-08-04 RX ADMIN — DOCUSATE SODIUM 1 MG: 100 CAPSULE, LIQUID FILLED ORAL at 09:00

## 2018-08-04 RX ADMIN — CALCIUM CARBONATE (ANTACID) CHEW TAB 500 MG 1 MG: 500 CHEW TAB at 13:26

## 2018-08-04 RX ADMIN — SUCRALFATE 1 GM: 1 SUSPENSION ORAL at 18:46

## 2018-08-05 RX ADMIN — METOCLOPRAMIDE HYDROCHLORIDE 1 MG: 10 INJECTION, SOLUTION INTRAMUSCULAR; INTRAVENOUS at 23:21

## 2018-08-05 RX ADMIN — DOCUSATE SODIUM 1 MG: 100 CAPSULE, LIQUID FILLED ORAL at 20:44

## 2018-08-05 RX ADMIN — FOLIC ACID TAB 400 MCG 1 MG: 400 TAB at 08:22

## 2018-08-05 RX ADMIN — METOCLOPRAMIDE HYDROCHLORIDE 1 MG: 10 INJECTION, SOLUTION INTRAMUSCULAR; INTRAVENOUS at 12:34

## 2018-08-05 RX ADMIN — SUCRALFATE 1 GM: 1 SUSPENSION ORAL at 08:24

## 2018-08-05 RX ADMIN — PREGABALIN 1 MG: 25 CAPSULE ORAL at 08:23

## 2018-08-05 RX ADMIN — SEVELAMER CARBONATE 1 MG: 800 TABLET, FILM COATED ORAL at 12:34

## 2018-08-05 RX ADMIN — SEVELAMER CARBONATE 1 MG: 800 TABLET, FILM COATED ORAL at 17:26

## 2018-08-05 RX ADMIN — INSULIN GLARGINE 1 UNITS: 100 INJECTION, SOLUTION SUBCUTANEOUS at 20:57

## 2018-08-05 RX ADMIN — SEVELAMER CARBONATE 1 MG: 800 TABLET, FILM COATED ORAL at 08:23

## 2018-08-05 RX ADMIN — PANTOPRAZOLE SODIUM 1 MG: 40 INJECTION, POWDER, FOR SOLUTION INTRAVENOUS at 17:29

## 2018-08-05 RX ADMIN — INSULIN ASPART 1 UNIT: 100 INJECTION, SOLUTION INTRAVENOUS; SUBCUTANEOUS at 20:43

## 2018-08-05 RX ADMIN — HEPARIN SODIUM 1 UNIT: 5000 INJECTION, SOLUTION INTRAVENOUS; SUBCUTANEOUS at 20:40

## 2018-08-05 RX ADMIN — SUCRALFATE 1 GM: 1 SUSPENSION ORAL at 12:34

## 2018-08-05 RX ADMIN — METOCLOPRAMIDE HYDROCHLORIDE 1 MG: 10 INJECTION, SOLUTION INTRAMUSCULAR; INTRAVENOUS at 06:40

## 2018-08-05 RX ADMIN — Medication 1 TAB: at 12:34

## 2018-08-05 RX ADMIN — INSULIN ASPART 1 UNIT: 100 INJECTION, SOLUTION INTRAVENOUS; SUBCUTANEOUS at 11:40

## 2018-08-05 RX ADMIN — CALCIUM CARBONATE (ANTACID) CHEW TAB 500 MG 1 MG: 500 CHEW TAB at 12:30

## 2018-08-05 RX ADMIN — METOCLOPRAMIDE HYDROCHLORIDE 1 MG: 10 INJECTION, SOLUTION INTRAMUSCULAR; INTRAVENOUS at 17:31

## 2018-08-05 RX ADMIN — CALCIUM CARBONATE (ANTACID) CHEW TAB 500 MG 1 MG: 500 CHEW TAB at 20:40

## 2018-08-05 RX ADMIN — CALCIUM CARBONATE (ANTACID) CHEW TAB 500 MG 1 MG: 500 CHEW TAB at 08:22

## 2018-08-05 RX ADMIN — METOCLOPRAMIDE HYDROCHLORIDE 1 MG: 10 INJECTION, SOLUTION INTRAMUSCULAR; INTRAVENOUS at 01:10

## 2018-08-05 RX ADMIN — SUCRALFATE 1 GM: 1 SUSPENSION ORAL at 20:43

## 2018-08-05 RX ADMIN — FERROUS SULFATE TAB 325 MG (65 MG ELEMENTAL FE) 1 MG: 325 (65 FE) TAB at 08:23

## 2018-08-05 RX ADMIN — DOCUSATE SODIUM 1 MG: 100 CAPSULE, LIQUID FILLED ORAL at 08:24

## 2018-08-05 RX ADMIN — FERROUS SULFATE TAB 325 MG (65 MG ELEMENTAL FE) 1 MG: 325 (65 FE) TAB at 20:41

## 2018-08-05 RX ADMIN — HEPARIN SODIUM 1 UNIT: 5000 INJECTION, SOLUTION INTRAVENOUS; SUBCUTANEOUS at 08:29

## 2018-08-05 RX ADMIN — INSULIN ASPART 1 UNIT: 100 INJECTION, SOLUTION INTRAVENOUS; SUBCUTANEOUS at 07:50

## 2018-08-05 RX ADMIN — INSULIN ASPART 1 UNIT: 100 INJECTION, SOLUTION INTRAVENOUS; SUBCUTANEOUS at 17:38

## 2018-08-05 RX ADMIN — SUCRALFATE 1 GM: 1 SUSPENSION ORAL at 17:26

## 2018-08-05 RX ADMIN — INSULIN GLARGINE 1 UNITS: 100 INJECTION, SOLUTION SUBCUTANEOUS at 20:39

## 2018-08-05 RX ADMIN — ALPRAZOLAM 1 MG: 0.5 TABLET ORAL at 02:26

## 2018-08-05 RX ADMIN — HEPARIN SODIUM 1 UNIT: 5000 INJECTION, SOLUTION INTRAVENOUS; SUBCUTANEOUS at 20:57

## 2018-08-05 RX ADMIN — PANTOPRAZOLE SODIUM 1 MG: 40 INJECTION, POWDER, FOR SOLUTION INTRAVENOUS at 06:40

## 2018-08-06 LAB
ADD MAN DIFF?: NO
ANION GAP: 17 (ref 8–16)
BASOPHIL #: 0.1 10^3/UL (ref 0–0.1)
BASOPHILS %: 0.4 % (ref 0–2)
BLOOD UREA NITROGEN: 70 MG/DL (ref 7–20)
CALCIUM: 8.6 MG/DL (ref 8.4–10.2)
CARBON DIOXIDE: 24 MMOL/L (ref 21–31)
CHLORIDE: 100 MMOL/L (ref 97–110)
CREATININE: 12 MG/DL (ref 0.61–1.24)
EOSINOPHILS #: 0.3 10^3/UL (ref 0–0.5)
EOSINOPHILS %: 2.5 % (ref 0–7)
GLUCOSE: 92 MG/DL (ref 70–220)
HEMATOCRIT: 33.6 % (ref 42–52)
HEMOGLOBIN A1C: 6.2 % (ref 0–5.9)
HEMOGLOBIN: 11 G/DL (ref 14–18)
LYMPHOCYTES #: 2 10^3/UL (ref 0.8–2.9)
LYMPHOCYTES %: 14.7 % (ref 15–51)
MAGNESIUM: 2.2 MG/DL (ref 1.7–2.5)
MEAN CORPUSCULAR HEMOGLOBIN: 29 PG (ref 29–33)
MEAN CORPUSCULAR HGB CONC: 32.7 G/DL (ref 32–37)
MEAN CORPUSCULAR VOLUME: 88.7 FL (ref 82–101)
MEAN PLATELET VOLUME: 10.7 FL (ref 7.4–10.4)
MONOCYTE #: 1.5 10^3/UL (ref 0.3–0.9)
MONOCYTES %: 10.7 % (ref 0–11)
NEUTROPHIL #: 9.7 10^3/UL (ref 1.6–7.5)
NEUTROPHILS %: 71.3 % (ref 39–77)
NUCLEATED RED BLOOD CELLS #: 0 10^3/UL (ref 0–0)
NUCLEATED RED BLOOD CELLS%: 0 /100WBC (ref 0–0)
PHOSPHORUS: 3.1 MG/DL (ref 2.5–4.9)
PLATELET COUNT: 274 10^3/UL (ref 140–415)
POTASSIUM: 5.7 MMOL/L (ref 3.5–5.1)
RED BLOOD COUNT: 3.79 10^6/UL (ref 4.7–6.1)
RED CELL DISTRIBUTION WIDTH: 13.8 % (ref 11.5–14.5)
SODIUM: 135 MMOL/L (ref 135–144)
WHITE BLOOD COUNT: 13.6 10^3/UL (ref 4.8–10.8)

## 2018-08-06 RX ADMIN — DOCUSATE SODIUM 1 MG: 100 CAPSULE, LIQUID FILLED ORAL at 08:25

## 2018-08-06 RX ADMIN — METOCLOPRAMIDE HYDROCHLORIDE 1 MG: 10 INJECTION, SOLUTION INTRAMUSCULAR; INTRAVENOUS at 17:43

## 2018-08-06 RX ADMIN — HYDRALAZINE HYDROCHLORIDE 1 MG: 20 INJECTION INTRAMUSCULAR; INTRAVENOUS at 12:10

## 2018-08-06 RX ADMIN — METOCLOPRAMIDE HYDROCHLORIDE 1 MG: 10 INJECTION, SOLUTION INTRAMUSCULAR; INTRAVENOUS at 12:00

## 2018-08-06 RX ADMIN — FOLIC ACID TAB 400 MCG 1 MG: 400 TAB at 08:24

## 2018-08-06 RX ADMIN — CALCIUM CARBONATE (ANTACID) CHEW TAB 500 MG 1 MG: 500 CHEW TAB at 08:24

## 2018-08-06 RX ADMIN — Medication 1 TAB: at 08:24

## 2018-08-06 RX ADMIN — HEPARIN SODIUM 1 UNIT: 5000 INJECTION, SOLUTION INTRAVENOUS; SUBCUTANEOUS at 08:26

## 2018-08-06 RX ADMIN — FERROUS SULFATE TAB 325 MG (65 MG ELEMENTAL FE) 1 MG: 325 (65 FE) TAB at 08:24

## 2018-08-06 RX ADMIN — SEVELAMER CARBONATE 1 MG: 800 TABLET, FILM COATED ORAL at 13:06

## 2018-08-06 RX ADMIN — SUCRALFATE 1 GM: 1 SUSPENSION ORAL at 08:24

## 2018-08-06 RX ADMIN — INSULIN ASPART 1 UNIT: 100 INJECTION, SOLUTION INTRAVENOUS; SUBCUTANEOUS at 13:08

## 2018-08-06 RX ADMIN — METOCLOPRAMIDE HYDROCHLORIDE 1 MG: 10 INJECTION, SOLUTION INTRAMUSCULAR; INTRAVENOUS at 05:19

## 2018-08-06 RX ADMIN — HEPARIN SODIUM 1 UNIT: 1000 INJECTION, SOLUTION INTRAVENOUS; SUBCUTANEOUS at 13:48

## 2018-08-06 RX ADMIN — SUCRALFATE 1 GM: 1 SUSPENSION ORAL at 17:00

## 2018-08-06 RX ADMIN — PREGABALIN 1 MG: 25 CAPSULE ORAL at 08:29

## 2018-08-06 RX ADMIN — PANTOPRAZOLE SODIUM 1 MG: 40 INJECTION, POWDER, FOR SOLUTION INTRAVENOUS at 05:20

## 2018-08-06 RX ADMIN — CALCIUM CARBONATE (ANTACID) CHEW TAB 500 MG 1 MG: 500 CHEW TAB at 13:05

## 2018-08-06 RX ADMIN — INSULIN ASPART 1 UNIT: 100 INJECTION, SOLUTION INTRAVENOUS; SUBCUTANEOUS at 17:43

## 2018-08-06 RX ADMIN — SUCRALFATE 1 GM: 1 SUSPENSION ORAL at 13:05

## 2018-08-06 RX ADMIN — INSULIN ASPART 1 UNIT: 100 INJECTION, SOLUTION INTRAVENOUS; SUBCUTANEOUS at 07:50

## 2018-08-06 RX ADMIN — SEVELAMER CARBONATE 1 MG: 800 TABLET, FILM COATED ORAL at 17:43

## 2018-08-06 RX ADMIN — PANTOPRAZOLE SODIUM 1 MG: 40 INJECTION, POWDER, FOR SOLUTION INTRAVENOUS at 17:43

## 2018-08-06 RX ADMIN — SEVELAMER CARBONATE 1 MG: 800 TABLET, FILM COATED ORAL at 08:24
